# Patient Record
Sex: FEMALE | Race: WHITE | NOT HISPANIC OR LATINO | Employment: OTHER | URBAN - METROPOLITAN AREA
[De-identification: names, ages, dates, MRNs, and addresses within clinical notes are randomized per-mention and may not be internally consistent; named-entity substitution may affect disease eponyms.]

---

## 2019-05-02 ENCOUNTER — OFFICE VISIT (OUTPATIENT)
Dept: FAMILY MEDICINE CLINIC | Facility: CLINIC | Age: 69
End: 2019-05-02

## 2019-05-02 VITALS
DIASTOLIC BLOOD PRESSURE: 68 MMHG | OXYGEN SATURATION: 97 % | SYSTOLIC BLOOD PRESSURE: 100 MMHG | HEART RATE: 77 BPM | TEMPERATURE: 98.1 F | WEIGHT: 177 LBS | BODY MASS INDEX: 26.83 KG/M2 | HEIGHT: 68 IN | RESPIRATION RATE: 18 BRPM

## 2019-05-02 DIAGNOSIS — R10.11 RUQ ABDOMINAL PAIN: ICD-10-CM

## 2019-05-02 DIAGNOSIS — K80.20 CALCULUS OF GALLBLADDER WITHOUT CHOLECYSTITIS WITHOUT OBSTRUCTION: Primary | ICD-10-CM

## 2019-05-02 DIAGNOSIS — Z98.890 HISTORY OF MOHS MICROGRAPHIC SURGERY FOR SQUAMOUS CELL CARCINOMA IN SITU (SCCIS) OF SKIN: ICD-10-CM

## 2019-05-02 DIAGNOSIS — I10 ESSENTIAL HYPERTENSION: ICD-10-CM

## 2019-05-02 DIAGNOSIS — E78.2 MIXED HYPERLIPIDEMIA: ICD-10-CM

## 2019-05-02 DIAGNOSIS — Z86.007 HISTORY OF MOHS MICROGRAPHIC SURGERY FOR SQUAMOUS CELL CARCINOMA IN SITU (SCCIS) OF SKIN: ICD-10-CM

## 2019-05-02 PROCEDURE — 99203 OFFICE O/P NEW LOW 30 MIN: CPT | Performed by: FAMILY MEDICINE

## 2019-05-02 RX ORDER — BUPROPION HYDROCHLORIDE 150 MG/1
150 TABLET ORAL DAILY
COMMUNITY
End: 2021-07-21

## 2019-05-02 RX ORDER — CHLORTHALIDONE 25 MG/1
25 TABLET ORAL DAILY
COMMUNITY

## 2019-05-02 RX ORDER — ATORVASTATIN CALCIUM 80 MG/1
80 TABLET, FILM COATED ORAL DAILY
COMMUNITY

## 2019-05-02 RX ORDER — AMLODIPINE BESYLATE 5 MG/1
5 TABLET ORAL DAILY
COMMUNITY

## 2019-05-02 RX ORDER — ALBUTEROL SULFATE 90 UG/1
AEROSOL, METERED RESPIRATORY (INHALATION)
COMMUNITY
Start: 2012-01-11 | End: 2021-07-21

## 2019-05-02 RX ORDER — LISINOPRIL 10 MG/1
10 TABLET ORAL DAILY
COMMUNITY
End: 2021-07-21 | Stop reason: ALTCHOICE

## 2019-05-02 NOTE — PROGRESS NOTES
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     DISCUSSION  Due to recent visualization of gallstones on CT scan of chest and intermittent symptoms of right upper quadrant pain, check ultrasound.  If the ultrasound confirms multiple gallstones with any gallbladder wall thickening, she may need to consider removal.  Further plans once that is back.    Otherwise chronic problems including hypertension, hyperlipidemia are all stable.    Repeat labs at end of June (CMP and lipids )    MEDICATIONS PRESCRIBED  Requested Prescriptions      No prescriptions requested or ordered in this encounter          -------------------------------------------    Subjective     Chief Complaint   Patient presents with   • Establish Care   • Cholelithiasis         Hypertension   This is a chronic problem. The current episode started more than 1 year ago. The problem is unchanged. The problem is controlled. Pertinent negatives include no chest pain (unless with gallbladder attack), headaches, peripheral edema or shortness of breath. There are no associated agents to hypertension. Risk factors for coronary artery disease include dyslipidemia. Current antihypertension treatment includes calcium  channel blockers, diuretics and ACE inhibitors. The current treatment provides moderate improvement. There are no compliance problems.  There is no history of angina, kidney disease or CAD/MI. There is no history of chronic renal disease.   Hyperlipidemia   This is a chronic problem. The current episode started more than 1 year ago. The problem is controlled (med just increased whern LDL > 100). She has no history of chronic renal disease. Pertinent negatives include no chest pain (unless with gallbladder attack), myalgias (has some aches in the am) or shortness of breath. Current antihyperlipidemic treatment includes statins. The current treatment provides moderate improvement of lipids. There are no compliance problems.  Risk factors for coronary artery disease include dyslipidemia and hypertension.       Here as new patient   Lives in [place] (Oct to April) and here during summer.   [place] ( [place] of [place]        Gallstones  Dx 4 months  Occ pain   Right side, may radiate to the chest  Had CT for chest cancer screening : found lymph node and had repeat CT and no change and to repeat in one year. Last 3/25/2019    Has had 2 attacks  Last attack: 10 days    Trying to watch greasy fried foods    Does not want gallstones/ gallbladder out at this time    U/s was not done    History of fracture foot     HS in [place]  Went to [employer]   Grad in , Achieved.coications   3 children  8 grandchildren   for 20 yrs              Social History     Tobacco Use   Smoking Status Former Smoker   • Years: 40.00   • Last attempt to quit: 2019   • Years since quittin.0        Past Medical History,Medications, Allergies, and social history was reviewed.      Review of Systems   Constitutional: Negative.    HENT: Negative.    Respiratory: Negative.  Negative for shortness of breath.    Cardiovascular: Negative.  Negative for chest pain (unless with gallbladder attack).   Gastrointestinal: Positive for  "abdominal pain. Negative for blood in stool, nausea and vomiting.   Musculoskeletal: Positive for arthralgias. Negative for myalgias (has some aches in the am).   Neurological: Negative.    Psychiatric/Behavioral: Negative.        Objective     Vitals:    05/02/19 1404   BP: 100/68   BP Location: Left arm   Patient Position: Sitting   Cuff Size: Adult   Pulse: 77   Resp: 18   Temp: 98.1 °F (36.7 °C)   TempSrc: Temporal   SpO2: 97%   Weight: 80.3 kg (177 lb)   Height: 173 cm (68.11\")          Physical Exam   Constitutional: She appears well-developed and well-nourished.   HENT:   Head: Normocephalic and atraumatic.   Right Ear: Hearing, tympanic membrane, external ear and ear canal normal.   Left Ear: Hearing, tympanic membrane, external ear and ear canal normal.   Mouth/Throat: Oropharynx is clear and moist.   Eyes: Conjunctivae and EOM are normal. Pupils are equal, round, and reactive to light.   Neck: Normal range of motion. Neck supple. No thyromegaly present.   Cardiovascular: Normal rate, regular rhythm and normal heart sounds. Exam reveals no gallop and no friction rub.   No murmur heard.  Pulmonary/Chest: Effort normal and breath sounds normal. No respiratory distress. She has no wheezes. She has no rales.   Abdominal: Soft. Bowel sounds are normal. She exhibits no distension. There is no tenderness. There is no rebound and no guarding.   Musculoskeletal: She exhibits no edema.   Neurological: She is alert.   Skin: Skin is warm and dry.   Psychiatric: She has a normal mood and affect.   Nursing note and vitals reviewed.              Faisal Mar MD    "

## 2019-05-07 ENCOUNTER — HOSPITAL ENCOUNTER (OUTPATIENT)
Dept: ULTRASOUND IMAGING | Facility: HOSPITAL | Age: 69
Discharge: HOME | End: 2019-05-07
Admitting: FAMILY MEDICINE

## 2019-05-07 DIAGNOSIS — K80.20 CALCULUS OF GALLBLADDER WITHOUT CHOLECYSTITIS WITHOUT OBSTRUCTION: ICD-10-CM

## 2019-05-07 DIAGNOSIS — R10.11 RUQ ABDOMINAL PAIN: ICD-10-CM

## 2019-05-07 PROCEDURE — 76705 ECHO EXAM OF ABDOMEN: CPT

## 2019-06-17 ENCOUNTER — RESULTS ENCOUNTER (OUTPATIENT)
Dept: FAMILY MEDICINE CLINIC | Facility: CLINIC | Age: 69
End: 2019-06-17

## 2019-06-17 DIAGNOSIS — E78.2 MIXED HYPERLIPIDEMIA: ICD-10-CM

## 2019-06-17 DIAGNOSIS — I10 ESSENTIAL HYPERTENSION: ICD-10-CM

## 2019-07-10 ENCOUNTER — APPOINTMENT (OUTPATIENT)
Dept: LAB | Facility: HOSPITAL | Age: 69
End: 2019-07-10

## 2019-07-10 PROCEDURE — 80053 COMPREHEN METABOLIC PANEL: CPT | Performed by: FAMILY MEDICINE

## 2019-07-10 PROCEDURE — 80061 LIPID PANEL: CPT | Performed by: FAMILY MEDICINE

## 2019-07-11 LAB
ALBUMIN SERPL-MCNC: 4.7 G/DL (ref 3.5–5.2)
ALBUMIN/GLOB SERPL: 2.2 G/DL
ALP SERPL-CCNC: 93 U/L (ref 39–117)
ALT SERPL W P-5'-P-CCNC: 20 U/L (ref 1–33)
ANION GAP SERPL CALCULATED.3IONS-SCNC: 14.2 MMOL/L (ref 5–15)
AST SERPL-CCNC: 24 U/L (ref 1–32)
BILIRUB SERPL-MCNC: 0.5 MG/DL (ref 0.2–1.2)
BUN BLD-MCNC: 6 MG/DL (ref 8–23)
BUN/CREAT SERPL: 9 (ref 7–25)
CALCIUM SPEC-SCNC: 10.1 MG/DL (ref 8.6–10.5)
CHLORIDE SERPL-SCNC: 96 MMOL/L (ref 98–107)
CHOLEST SERPL-MCNC: 169 MG/DL (ref 0–200)
CO2 SERPL-SCNC: 26.8 MMOL/L (ref 22–29)
CREAT BLD-MCNC: 0.67 MG/DL (ref 0.57–1)
GFR SERPL CREATININE-BSD FRML MDRD: 88 ML/MIN/1.73
GLOBULIN UR ELPH-MCNC: 2.1 GM/DL
GLUCOSE BLD-MCNC: 101 MG/DL (ref 65–99)
HDLC SERPL-MCNC: 68 MG/DL (ref 40–60)
LDLC SERPL CALC-MCNC: 77 MG/DL (ref 0–100)
LDLC/HDLC SERPL: 1.13 {RATIO}
POTASSIUM BLD-SCNC: 5.2 MMOL/L (ref 3.5–5.2)
PROT SERPL-MCNC: 6.8 G/DL (ref 6–8.5)
SODIUM BLD-SCNC: 137 MMOL/L (ref 136–145)
TRIGL SERPL-MCNC: 122 MG/DL (ref 0–150)
VLDLC SERPL-MCNC: 24.4 MG/DL (ref 5–40)

## 2019-08-01 ENCOUNTER — TELEPHONE (OUTPATIENT)
Dept: FAMILY MEDICINE CLINIC | Facility: CLINIC | Age: 69
End: 2019-08-01

## 2019-08-01 DIAGNOSIS — Z96.649 HISTORY OF HIP REPLACEMENT, UNSPECIFIED LATERALITY: Primary | ICD-10-CM

## 2019-08-01 NOTE — TELEPHONE ENCOUNTER
Please call, does she have heart murmur or heart valve replacement? Or any joint replacements? Just need to know why the dentist wants her on antibiotic.

## 2019-08-01 NOTE — TELEPHONE ENCOUNTER
----- Message from Manjinder Brown Rep sent at 8/1/2019  1:32 PM EDT -----  Contact: [facility] / PT   PT CALLED AND STATED THAT SHE IS HAVING DENTAL WORK DONE ON 8/7 AND HER DENTIST STATED THAT SHE NEEDS TO BE ON AN ANTIBIOTIC - SHE IS HAVING A CLEANING AND A CHECK UP - NO EXTRACTIONS    [facility]    PT CALL BACK [phone]

## 2019-08-02 RX ORDER — AMOXICILLIN 500 MG/1
2000 CAPSULE ORAL ONCE
Qty: 4 CAPSULE | Refills: 0 | Status: SHIPPED | OUTPATIENT
Start: 2019-08-02 | End: 2019-08-02

## 2019-08-02 NOTE — TELEPHONE ENCOUNTER
Please call, requested meds sent to pharmacy.     Amoxicillin 500 mg 4 pills one hour before procedure.

## 2021-07-21 ENCOUNTER — OFFICE VISIT (OUTPATIENT)
Dept: FAMILY MEDICINE CLINIC | Facility: CLINIC | Age: 71
End: 2021-07-21

## 2021-07-21 VITALS
HEIGHT: 70 IN | TEMPERATURE: 97.3 F | HEART RATE: 64 BPM | DIASTOLIC BLOOD PRESSURE: 70 MMHG | RESPIRATION RATE: 24 BRPM | SYSTOLIC BLOOD PRESSURE: 130 MMHG | WEIGHT: 180.6 LBS | BODY MASS INDEX: 25.86 KG/M2 | OXYGEN SATURATION: 97 %

## 2021-07-21 DIAGNOSIS — J44.0 COPD WITH ACUTE BRONCHITIS (HCC): Primary | ICD-10-CM

## 2021-07-21 DIAGNOSIS — J20.9 COPD WITH ACUTE BRONCHITIS (HCC): Primary | ICD-10-CM

## 2021-07-21 PROCEDURE — 99213 OFFICE O/P EST LOW 20 MIN: CPT | Performed by: FAMILY MEDICINE

## 2021-07-21 RX ORDER — LOSARTAN POTASSIUM 25 MG/1
25 TABLET ORAL DAILY
COMMUNITY

## 2021-07-21 RX ORDER — PREDNISONE 20 MG/1
20 TABLET ORAL 2 TIMES DAILY
Qty: 10 TABLET | Refills: 0 | Status: SHIPPED | OUTPATIENT
Start: 2021-07-21 | End: 2021-07-26

## 2021-07-21 RX ORDER — AMOXICILLIN AND CLAVULANATE POTASSIUM 875; 125 MG/1; MG/1
1 TABLET, FILM COATED ORAL 2 TIMES DAILY
Qty: 20 TABLET | Refills: 0 | Status: SHIPPED | OUTPATIENT
Start: 2021-07-21 | End: 2021-07-31

## 2021-07-21 RX ORDER — ALBUTEROL SULFATE 90 UG/1
2 AEROSOL, METERED RESPIRATORY (INHALATION) EVERY 4 HOURS PRN
Qty: 8 G | Refills: 0 | Status: SHIPPED | OUTPATIENT
Start: 2021-07-21

## 2021-07-21 NOTE — PROGRESS NOTES
"Chief Complaint  cough, chest congestion, HA (started last Wed. / just finished steroid, taking Tessalon Perles & Mucinex  )    Subjective          Lanie Ruiz presents to [facility]  Cough  This is a new problem. The current episode started in the past 7 days. The problem has been unchanged. The problem occurs every few minutes. The cough is productive of sputum. Associated symptoms include headaches, nasal congestion, shortness of breath and wheezing. Pertinent negatives include no ear congestion, ear pain, postnasal drip or sore throat. The symptoms are aggravated by lying down. Risk factors for lung disease include smoking/tobacco exposure. She has tried oral steroids (Mucinex, steroid, Tessalon) for the symptoms. The treatment provided mild relief.     The following portions of the patient's history were reviewed and updated as appropriate: allergies, current medications, past family history, past medical history, past social history, past surgical history and problem list.    Objective   Vital Signs:   /70   Pulse 64   Temp 97.3 °F (36.3 °C)   Resp 24   Ht 177.8 cm (70\")   Wt 81.9 kg (180 lb 9.6 oz)   SpO2 97%   BMI 25.91 kg/m²     Physical Exam  Vitals and nursing note reviewed.   Constitutional:       Appearance: She is well-developed.   HENT:      Head: Normocephalic and atraumatic.      Right Ear: Tympanic membrane, ear canal and external ear normal.      Left Ear: Tympanic membrane, ear canal and external ear normal.      Nose: Nose normal.   Eyes:      Conjunctiva/sclera: Conjunctivae normal.   Cardiovascular:      Rate and Rhythm: Normal rate and regular rhythm.      Heart sounds: Normal heart sounds.   Pulmonary:      Effort: Pulmonary effort is normal.      Breath sounds: Examination of the left-upper field reveals wheezing. Wheezing present. No rhonchi.   Musculoskeletal:         General: No swelling.      Cervical back: Neck supple.   Skin:     " General: Skin is warm and dry.   Neurological:      Mental Status: She is alert and oriented to person, place, and time.   Psychiatric:         Behavior: Behavior normal.        Result Review :                 Assessment and Plan    Diagnoses and all orders for this visit:    1. COPD with acute bronchitis (CMS/HCC) (Primary)  -     predniSONE (DELTASONE) 20 MG tablet; Take 1 tablet by mouth 2 (Two) Times a Day for 5 days.  Dispense: 10 tablet; Refill: 0  -     albuterol sulfate  (90 Base) MCG/ACT inhaler; Inhale 2 puffs Every 4 (Four) Hours As Needed for Wheezing or Shortness of Air.  Dispense: 8 g; Refill: 0  -     amoxicillin-clavulanate (Augmentin) 875-125 MG per tablet; Take 1 tablet by mouth 2 (Two) Times a Day for 10 days.  Dispense: 20 tablet; Refill: 0    Follow up if symptoms don't resolve.       Follow Up   Return if symptoms worsen or fail to improve.  Patient was given instructions and counseling regarding her condition or for health maintenance advice. Please see specific information pulled into the AVS if appropriate.

## 2021-08-03 ENCOUNTER — TELEPHONE (OUTPATIENT)
Dept: FAMILY MEDICINE CLINIC | Facility: CLINIC | Age: 71
End: 2021-08-03

## 2021-08-03 RX ORDER — FLUCONAZOLE 150 MG/1
TABLET ORAL
Qty: 2 TABLET | Refills: 0 | Status: SHIPPED | OUTPATIENT
Start: 2021-08-03

## 2021-08-03 NOTE — TELEPHONE ENCOUNTER
Caller: Joseph Katerine    Relationship: Self    Best call back number: [phone]    What medication are you requesting: something for yeast infection    What are your current symptoms: itching    How long have you been experiencing symptoms: 3 days    Have you had these symptoms before:    [x] Yes  [] No    Have you been treated for these symptoms before:   [x] Yes  [] No    If a prescription is needed, what is your preferred pharmacy and phone number:      Additional notes: [facility] [phone]

## 2023-03-01 NOTE — TELEPHONE ENCOUNTER
Please call.  I sent in the Diflucan pill 1 now and 1 in 3 days if not getting better.   Tarsha Lacey MD

## 2023-04-19 ENCOUNTER — OFFICE VISIT (OUTPATIENT)
Dept: FAMILY MEDICINE CLINIC | Facility: CLINIC | Age: 73
End: 2023-04-19
Payer: MEDICARE

## 2023-04-19 VITALS
HEART RATE: 76 BPM | DIASTOLIC BLOOD PRESSURE: 84 MMHG | OXYGEN SATURATION: 97 % | SYSTOLIC BLOOD PRESSURE: 142 MMHG | TEMPERATURE: 97.5 F | WEIGHT: 174.6 LBS | BODY MASS INDEX: 25 KG/M2 | HEIGHT: 70 IN | RESPIRATION RATE: 18 BRPM

## 2023-04-19 DIAGNOSIS — M79.605 BILATERAL LEG PAIN: Primary | ICD-10-CM

## 2023-04-19 DIAGNOSIS — M79.604 BILATERAL LEG PAIN: Primary | ICD-10-CM

## 2023-04-19 DIAGNOSIS — F17.200 CURRENT EVERY DAY SMOKER: ICD-10-CM

## 2023-04-19 PROCEDURE — 99214 OFFICE O/P EST MOD 30 MIN: CPT | Performed by: PHYSICIAN ASSISTANT

## 2023-04-19 PROCEDURE — 3079F DIAST BP 80-89 MM HG: CPT | Performed by: PHYSICIAN ASSISTANT

## 2023-04-19 PROCEDURE — 3077F SYST BP >= 140 MM HG: CPT | Performed by: PHYSICIAN ASSISTANT

## 2023-04-19 RX ORDER — IBUPROFEN 800 MG
TABLET ORAL
COMMUNITY
End: 2023-04-19

## 2023-04-19 RX ORDER — PREDNISONE 10 MG/1
TABLET ORAL
Qty: 21 EACH | Refills: 0 | Status: SHIPPED | OUTPATIENT
Start: 2023-04-19

## 2023-04-20 DIAGNOSIS — E78.00 HYPERCHOLESTEREMIA: Primary | ICD-10-CM

## 2023-04-20 LAB
BUN SERPL-MCNC: 11 MG/DL (ref 8–23)
BUN/CREAT SERPL: 15.3 (ref 7–25)
CALCIUM SERPL-MCNC: 10.3 MG/DL (ref 8.6–10.5)
CHLORIDE SERPL-SCNC: 102 MMOL/L (ref 98–107)
CK SERPL-CCNC: 159 U/L (ref 20–180)
CO2 SERPL-SCNC: 25.6 MMOL/L (ref 22–29)
CREAT SERPL-MCNC: 0.72 MG/DL (ref 0.57–1)
CRP SERPL-MCNC: <0.3 MG/DL (ref 0–0.5)
D DIMER PPP FEU-MCNC: 0.32 MCGFEU/ML (ref 0–0.71)
EGFRCR SERPLBLD CKD-EPI 2021: 89.5 ML/MIN/1.73
ERYTHROCYTE [SEDIMENTATION RATE] IN BLOOD BY WESTERGREN METHOD: 3 MM/HR (ref 0–30)
GLUCOSE SERPL-MCNC: 86 MG/DL (ref 65–99)
MAGNESIUM SERPL-MCNC: 2.2 MG/DL (ref 1.6–2.4)
POTASSIUM SERPL-SCNC: 4.7 MMOL/L (ref 3.5–5.2)
SODIUM SERPL-SCNC: 141 MMOL/L (ref 136–145)

## 2023-04-20 RX ORDER — ATORVASTATIN CALCIUM 40 MG/1
40 TABLET, FILM COATED ORAL DAILY
Qty: 90 TABLET | Refills: 1 | Status: SHIPPED | OUTPATIENT
Start: 2023-04-20

## 2023-05-09 ENCOUNTER — OFFICE VISIT (OUTPATIENT)
Dept: FAMILY MEDICINE CLINIC | Facility: CLINIC | Age: 73
End: 2023-05-09
Payer: MEDICARE

## 2023-05-09 VITALS
OXYGEN SATURATION: 96 % | TEMPERATURE: 97.5 F | RESPIRATION RATE: 18 BRPM | BODY MASS INDEX: 24.91 KG/M2 | DIASTOLIC BLOOD PRESSURE: 80 MMHG | HEIGHT: 70 IN | SYSTOLIC BLOOD PRESSURE: 118 MMHG | WEIGHT: 174 LBS | HEART RATE: 77 BPM

## 2023-05-09 DIAGNOSIS — L98.9 SKIN LESION OF RIGHT LEG: ICD-10-CM

## 2023-05-09 DIAGNOSIS — J30.2 SEASONAL ALLERGIC RHINITIS, UNSPECIFIED TRIGGER: Primary | ICD-10-CM

## 2023-05-09 DIAGNOSIS — J30.2 SEASONAL ALLERGIC RHINITIS, UNSPECIFIED TRIGGER: ICD-10-CM

## 2023-05-09 DIAGNOSIS — Z85.89 HISTORY OF SQUAMOUS CELL CARCINOMA: ICD-10-CM

## 2023-05-09 PROCEDURE — 99214 OFFICE O/P EST MOD 30 MIN: CPT

## 2023-05-09 PROCEDURE — 3074F SYST BP LT 130 MM HG: CPT

## 2023-05-09 PROCEDURE — 1160F RVW MEDS BY RX/DR IN RCRD: CPT

## 2023-05-09 PROCEDURE — 3079F DIAST BP 80-89 MM HG: CPT

## 2023-05-09 PROCEDURE — 1159F MED LIST DOCD IN RCRD: CPT

## 2023-05-09 RX ORDER — LORATADINE 10 MG/1
10 TABLET ORAL DAILY
Qty: 30 TABLET | Refills: 1 | Status: SHIPPED | OUTPATIENT
Start: 2023-05-09

## 2023-05-09 RX ORDER — FLUTICASONE PROPIONATE 50 MCG
2 SPRAY, SUSPENSION (ML) NASAL DAILY
Qty: 15.8 ML | Refills: 0 | Status: SHIPPED | OUTPATIENT
Start: 2023-05-09

## 2023-05-09 RX ORDER — PHENTERMINE HYDROCHLORIDE 37.5 MG/1
37.5 CAPSULE ORAL EVERY MORNING
COMMUNITY

## 2023-05-09 RX ORDER — FLUTICASONE PROPIONATE 50 MCG
SPRAY, SUSPENSION (ML) NASAL
Qty: 48 G | OUTPATIENT
Start: 2023-05-09

## 2023-05-09 NOTE — PROGRESS NOTES
"Chief Complaint   Patient presents with   • Sinus Problem     Sneezing and  runny nose x 3 weeks. Otc not helping  Would like a dermatology referral        Subjective      Lanie Ruiz is a 72 y.o. who presents for rhinorrhea and sneezing for the last three weeks.  Took benadryl at home and she did not see any improvement.  States she was inside all day 3-4 days ago and had no sneezing, but then she woke up in the night and had sneezing.  Took benadryl 2 days ago and did not see any difference.  Took mucinex and it helped some, but she has never had allergies before and would like to make sure that is what is going on.  She has not tried any daily allergy medications or nasal sprays.  She denies fever/chills.     Dermatology - would like referral for skin check.  Cannot be seen until July with her regular dermatologist.  She has a history of skin cancer and would like an evaluation sooner.  She has two places that have come up on her posterior leg on the right side that look similar to places she has had removed in the past.       Review of Systems   Constitutional: Negative for chills and fever.   HENT: Positive for congestion, postnasal drip, rhinorrhea and sinus pressure. Negative for ear pain, sinus pain and sore throat.    Eyes: Negative.    Respiratory: Negative for cough, chest tightness and shortness of breath.    Cardiovascular: Negative for chest pain and palpitations.   Gastrointestinal: Negative.    Genitourinary: Negative.    Neurological: Negative for dizziness.        Objective   Vital Signs:  /80   Pulse 77   Temp 97.5 °F (36.4 °C)   Resp 18   Ht 177.8 cm (70\")   Wt 78.9 kg (174 lb)   SpO2 96%   BMI 24.97 kg/m²     Physical Exam  Vitals and nursing note reviewed.   Constitutional:       Appearance: Normal appearance.   HENT:      Head: Normocephalic.      Right Ear: Tympanic membrane, ear canal and external ear normal.      Left Ear: Tympanic membrane, ear canal and external ear normal. "      Nose: Rhinorrhea present. No nasal tenderness. Rhinorrhea is clear.      Right Turbinates: Swollen.      Left Turbinates: Swollen.      Right Sinus: No maxillary sinus tenderness or frontal sinus tenderness.      Left Sinus: No maxillary sinus tenderness or frontal sinus tenderness.      Mouth/Throat:      Mouth: Mucous membranes are moist.      Pharynx: Oropharynx is clear.   Eyes:      General:         Right eye: No discharge.         Left eye: No discharge.      Conjunctiva/sclera: Conjunctivae normal.   Cardiovascular:      Heart sounds: Normal heart sounds.   Pulmonary:      Breath sounds: Normal breath sounds.   Skin:         Neurological:      Mental Status: She is alert.          Result Review                     Assessment and Plan  Diagnoses and all orders for this visit:    1. Seasonal allergic rhinitis, unspecified trigger (Primary)  -     loratadine (Claritin) 10 MG tablet; Take 1 tablet by mouth Daily.  Dispense: 30 tablet; Refill: 1  -     fluticasone (FLONASE) 50 MCG/ACT nasal spray; 2 sprays into the nostril(s) as directed by provider Daily.  Dispense: 15.8 mL; Refill: 0    2. Skin lesion of right leg  -     Ambulatory Referral to Dermatology    3. History of squamous cell carcinoma  -     Ambulatory Referral to Dermatology      1. Allergic rhinitis - will start flonase and claritin to help with symptoms.  We discussed not using claritin-D for now and the risks of decongestants. Follow up if no improvement  2. Skin lesion - offered referral, but advised patient that she may be able to get in sooner if she gets on a cancellation list and is more likely to get a sooner appointment at the office she is established with.  Patient would still like a referral to dermatology to see if they can see her any sooner.       Patient Instructions   1. Flonase and claritin as prescribed.    2. May take mucinex OTC per package instructions as well  3. Referral to dermatology as well  4. Follow up if no  improvement or worsening in the next few weeks    Discussed medications prescribed and OTC medications recommended.  Discussed medication safety, possible side effects and how to take or administer medications. Instructed patient to report any adverse reactions, side effects or concerns.     Reviewed physical exam findings and plan with patient who verbalized understanding and agrees with plan of care. Patient was given opportunity to ask questions and all concerns were addressed prior to the conclusion of today's visit.         Follow Up  No follow-ups on file.  Patient was given instructions and counseling regarding her condition or for health maintenance advice. Please see specific information pulled into the AVS if appropriate.

## 2023-05-09 NOTE — PATIENT INSTRUCTIONS
Flonase and claritin as prescribed.    May take mucinex OTC per package instructions as well  Referral to dermatology as well  Follow up if no improvement or worsening in the next few weeks

## 2023-06-02 ENCOUNTER — OFFICE VISIT (OUTPATIENT)
Dept: FAMILY MEDICINE CLINIC | Facility: CLINIC | Age: 73
End: 2023-06-02

## 2023-06-02 VITALS
WEIGHT: 173 LBS | HEIGHT: 70 IN | DIASTOLIC BLOOD PRESSURE: 86 MMHG | HEART RATE: 92 BPM | OXYGEN SATURATION: 97 % | TEMPERATURE: 97.7 F | BODY MASS INDEX: 24.77 KG/M2 | SYSTOLIC BLOOD PRESSURE: 138 MMHG

## 2023-06-02 DIAGNOSIS — G89.29 CHRONIC RIGHT-SIDED LOW BACK PAIN WITH RIGHT-SIDED SCIATICA: ICD-10-CM

## 2023-06-02 DIAGNOSIS — J30.2 SEASONAL ALLERGIC RHINITIS, UNSPECIFIED TRIGGER: ICD-10-CM

## 2023-06-02 DIAGNOSIS — M54.6 ACUTE RIGHT-SIDED THORACIC BACK PAIN: Primary | ICD-10-CM

## 2023-06-02 DIAGNOSIS — M54.41 CHRONIC RIGHT-SIDED LOW BACK PAIN WITH RIGHT-SIDED SCIATICA: ICD-10-CM

## 2023-06-02 PROCEDURE — 1159F MED LIST DOCD IN RCRD: CPT

## 2023-06-02 PROCEDURE — 3079F DIAST BP 80-89 MM HG: CPT

## 2023-06-02 PROCEDURE — 99214 OFFICE O/P EST MOD 30 MIN: CPT

## 2023-06-02 PROCEDURE — 3075F SYST BP GE 130 - 139MM HG: CPT

## 2023-06-02 PROCEDURE — 1160F RVW MEDS BY RX/DR IN RCRD: CPT

## 2023-06-02 RX ORDER — CYCLOBENZAPRINE HCL 5 MG
5 TABLET ORAL NIGHTLY PRN
Qty: 20 TABLET | Refills: 0 | Status: SHIPPED | OUTPATIENT
Start: 2023-06-02

## 2023-06-02 RX ORDER — LEVOCETIRIZINE DIHYDROCHLORIDE 5 MG/1
5 TABLET, FILM COATED ORAL EVERY EVENING
Qty: 30 TABLET | Refills: 0 | Status: SHIPPED | OUTPATIENT
Start: 2023-06-02

## 2023-06-02 RX ORDER — METHYLPREDNISOLONE 4 MG/1
TABLET ORAL
Qty: 21 TABLET | Refills: 0 | Status: SHIPPED | OUTPATIENT
Start: 2023-06-02

## 2023-06-02 NOTE — PATIENT INSTRUCTIONS
Stop loratadine, start Xyzal as prescribed.  Steroids and muscle relaxer for back  Follow up in 2 weeks for recheck on allergies

## 2023-06-02 NOTE — PROGRESS NOTES
"Chief Complaint   Patient presents with   • Nasal Congestion     Going on for 1 month   • Pain     Sciatic pain is back   • Cough     Worse in the morning. Sneezing all day       Subjective      Lanie Ruiz is a 72 y.o. who presents for congestion, runny nose and continued sneezing over the last 4 weeks.  Has been using Flonase and taking Claritin, but no difference in symptoms.    Back pain - no injury.  Right shoulder blade area is affected.  Describes as a deep aching pain.  She is also having sciatic pain from right hip down to right knee.  Waking her up at night. [place] and her chronic sciatic pain is flared up this morning.     The following portions of the patient's history were reviewed and updated as appropriate: allergies, current medications, past family history, past medical history, past social history, past surgical history and problem list.    Review of Systems   Constitutional: Negative for chills and fever.   HENT: Positive for congestion, postnasal drip, rhinorrhea, sinus pressure and sneezing. Negative for ear discharge, ear pain and sore throat.    Respiratory: Positive for cough (clear mucous production). Negative for shortness of breath and wheezing.    Cardiovascular: Negative for chest pain and palpitations.   Gastrointestinal: Negative.    Musculoskeletal: Positive for back pain (right upper back below shoulder blade x 1 week).       Objective   Vital Signs:  /86   Pulse 92   Temp 97.7 °F (36.5 °C) (Infrared)   Ht 177.8 cm (70\")   Wt 78.5 kg (173 lb)   SpO2 97%   BMI 24.82 kg/m²     BMI is within normal parameters. No other follow-up for BMI required.        Physical Exam  Vitals and nursing note reviewed.   Constitutional:       Appearance: Normal appearance.   HENT:      Right Ear: Tympanic membrane, ear canal and external ear normal.      Left Ear: Tympanic membrane, ear canal and external ear normal.      Nose: Rhinorrhea " present. Rhinorrhea is clear.      Right Turbinates: Not enlarged.      Left Turbinates: Not enlarged.      Right Sinus: No maxillary sinus tenderness or frontal sinus tenderness.      Left Sinus: No maxillary sinus tenderness or frontal sinus tenderness.      Mouth/Throat:      Pharynx: Posterior oropharyngeal erythema present. No oropharyngeal exudate.   Cardiovascular:      Rate and Rhythm: Normal rate and regular rhythm.      Heart sounds: Normal heart sounds.   Pulmonary:      Effort: Pulmonary effort is normal.      Breath sounds: Normal breath sounds.   Musculoskeletal:      Cervical back: Normal. No tenderness or bony tenderness. Normal range of motion.      Thoracic back: Spasms and tenderness present.      Lumbar back: Normal.   Neurological:      General: No focal deficit present.      Mental Status: She is alert and oriented to person, place, and time.   Psychiatric:         Mood and Affect: Mood normal.         Behavior: Behavior normal.          Result Review                     Assessment and Plan  Diagnoses and all orders for this visit:    1. Acute right-sided thoracic back pain (Primary)  -     methylPREDNISolone (MEDROL) 4 MG dose pack; Take as directed on package instructions.  Dispense: 21 tablet; Refill: 0  -     cyclobenzaprine (FLEXERIL) 5 MG tablet; Take 1 tablet by mouth At Night As Needed for Muscle Spasms.  Dispense: 20 tablet; Refill: 0    2. Seasonal allergic rhinitis, unspecified trigger  -     levocetirizine (XYZAL) 5 MG tablet; Take 1 tablet by mouth Every Evening.  Dispense: 30 tablet; Refill: 0    3. Chronic right-sided low back pain with right-sided sciatica      Back pain- Medrol Dosepak and muscle relaxers as prescribed.    Seasonal allergies-stop Claritin as this made no difference in symptoms.  Continue Flonase.  Will try Xyzal.  Consider Singulair at follow-up.  Consider referral to allergist.        Patient Instructions   1. Stop loratadine, start Xyzal as  prescribed.  2. Steroids and muscle relaxer for back  3. Follow up in 2 weeks for recheck on allergies    Discussed medications prescribed and OTC medications recommended.  Discussed medication safety, possible side effects and how to take or administer medications. Instructed patient to report any adverse reactions, side effects or concerns.     Reviewed physical exam findings and plan with patient who verbalized understanding and agrees with plan of care. Patient was given opportunity to ask questions and all concerns were addressed prior to the conclusion of today's visit.     Follow Up  Return in about 2 weeks (around 6/16/2023) for Recheck allergies/back pain.  Patient was given instructions and counseling regarding her condition or for health maintenance advice. Please see specific information pulled into the AVS if appropriate.

## 2023-09-25 ENCOUNTER — OFFICE VISIT (OUTPATIENT)
Dept: CARDIOLOGY | Facility: CLINIC | Age: 73
End: 2023-09-25

## 2023-09-25 VITALS
DIASTOLIC BLOOD PRESSURE: 70 MMHG | SYSTOLIC BLOOD PRESSURE: 128 MMHG | BODY MASS INDEX: 24.77 KG/M2 | OXYGEN SATURATION: 100 % | WEIGHT: 173 LBS | HEIGHT: 70 IN | HEART RATE: 72 BPM

## 2023-09-25 DIAGNOSIS — R42 DIZZINESS: ICD-10-CM

## 2023-09-25 DIAGNOSIS — E78.2 MIXED HYPERLIPIDEMIA: ICD-10-CM

## 2023-09-25 DIAGNOSIS — I10 ESSENTIAL HYPERTENSION: Primary | ICD-10-CM

## 2023-09-25 DIAGNOSIS — R07.89 CHEST PAIN, ATYPICAL: ICD-10-CM

## 2023-09-25 PROCEDURE — 3074F SYST BP LT 130 MM HG: CPT | Performed by: PHYSICIAN ASSISTANT

## 2023-09-25 PROCEDURE — 3078F DIAST BP <80 MM HG: CPT | Performed by: PHYSICIAN ASSISTANT

## 2023-09-25 PROCEDURE — 99203 OFFICE O/P NEW LOW 30 MIN: CPT | Performed by: PHYSICIAN ASSISTANT

## 2023-09-25 PROCEDURE — 93000 ELECTROCARDIOGRAM COMPLETE: CPT | Performed by: PHYSICIAN ASSISTANT

## 2023-09-25 RX ORDER — NICOTINE 21 MG/24HR
1 PATCH, TRANSDERMAL 24 HOURS TRANSDERMAL EVERY 24 HOURS
COMMUNITY
Start: 2023-07-18

## 2023-09-25 RX ORDER — MULTIVIT-MIN/IRON/FOLIC ACID/K 18-600-40
CAPSULE ORAL
COMMUNITY

## 2023-09-25 NOTE — PROGRESS NOTES
"[facility]  INITIAL OFFICE CONSULT      Lanie Ruiz  1950  PCP: Faisal Mar MD    SUBJECTIVE:   Lanie Ruiz is a 72 y.o. female seen for a consultation visit regarding the following:     Chief Complaint:   Chief Complaint   Patient presents with    Hypertension     CONSULT          Consultation is requested by Self Referring for evaluation of Hypertension (CONSULT)        History:  72 year old female orginally form [place], [place]. She currently lives [facility].  She follows with a cardiologist locally in [place] she has never seen a cardiologist in our area.  She also sees Dr. Mar her PCP on occasion but also has a PCP down in [place].  She made an appointment today as a self-referral regarding some symptoms that she is concerned about.  She states for the past month while sitting reading and resting still she will start have a sudden onset of severe pain in her neck pressure knifelike sometimes rating up into her head.  This can be associate with some dizziness and weak feeling.  The symptoms are typically quite short.  They are so short she does not feel it necessary that she go to the hospital.  She tells me she can walk 3 miles without any symptoms.  She does have some chest pain this is intermittent as well sharp stabbing knifelike but occurs only when she is sitting at rest.  She has not had tachypalpitations.  She has not had heart failure symptoms like orthopnea PND peripheral edema.  She does not check her blood pressure regular basis but she restarted herself on chlorthalidone which she had stopped because she was concerned that some these symptoms may be due to blood pressure.  She tells me she is not a diabetic but has taken metformin the past to \"lose weight\".  She remains on aspirin daily amlodipine and Lipitor therapy.  She has tried to quit smoking several times but continues to smoke over half a pack a day.  She presents today for evaluation " regarding noncardiac chest pain and dizziness.      Cardiac PMH: (Old records have been reviewed and summarized below)  Noncardiac chest pain  Remote negative Stress MPS  Followed by EDUARDO  HTN  Multiple Medications  Recently restarted Chlorithiadone  Dizziness  Mild Obesity  HLD  Tobacco abuse, 1/2 ppd   OA   Family History of CAD,Brother MI       Past Medical History, Past Surgical History, Family history, Social History, and Medications were all reviewed with the patient today and updated as necessary.     Current Outpatient Medications   Medication Sig Dispense Refill    amLODIPine (NORVASC) 5 MG tablet Take 1 tablet by mouth Daily.      Ascorbic Acid (Vitamin C) 500 MG capsule Take  by mouth.      atorvastatin (Lipitor) 40 MG tablet Take 1 tablet by mouth Daily. (Patient taking differently: Take 2 tablets by mouth Daily.) 90 tablet 1    Cholecalciferol (Vitamin D-3) 125 MCG (5000 UT) tablet Vitamin D3      Cyanocobalamin (VITAMIN B 12 PO) Take  by mouth.      cyclobenzaprine (FLEXERIL) 5 MG tablet Take 1 tablet by mouth At Night As Needed for Muscle Spasms. 20 tablet 0    fluticasone (FLONASE) 50 MCG/ACT nasal spray 2 sprays into the nostril(s) as directed by provider Daily. 15.8 mL 0    losartan (COZAAR) 25 MG tablet Take 1 tablet by mouth Daily.      nicotine (NICODERM CQ) 21 MG/24HR patch Place 1 patch on the skin as directed by provider Daily.      metFORMIN (GLUCOPHAGE) 500 MG tablet Take 1 tablet by mouth.      phentermine 37.5 MG capsule Take 1 capsule by mouth Every Morning. Pt takes half a pill daily (Patient not taking: Reported on 9/25/2023)       No current facility-administered medications for this visit.     Allergies   Allergen Reactions    Latex Rash         Past Medical History:   Diagnosis Date    Arthritis     Cancer March 9 2023    Squamous cell    Cataract     COPD (chronic obstructive pulmonary disease) 2015    Enlarged lymph node in neck     near trachea    Gall stones     Hearing loss      "Hyperlipidemia     Hypertension     Sciatic pain      Past Surgical History:   Procedure Laterality Date    BREAST SURGERY  2018    Neg biopsies    CATARACT EXTRACTION, BILATERAL       SECTION  1981    COLONOSCOPY      JOINT REPLACEMENT  2017    L hip    MOHS SURGERY      TOTAL HIP ARTHROPLASTY Right     TUBAL ABDOMINAL LIGATION       Family History   Problem Relation Age of Onset    Heart disease Mother     Hypertension Mother     Stroke Mother     Heart attack Mother     Diabetes Father     Hypertension Father     Hyperlipidemia Father     Stroke Brother     Hypertension Brother     Cancer Brother      Social History     Tobacco Use    Smoking status: Every Day     Packs/day: 0.50     Years: 40.00     Pack years: 20.00     Types: Cigarettes     Last attempt to quit: 2023     Years since quittin.3    Smokeless tobacco: Never   Substance Use Topics    Alcohol use: Yes     Alcohol/week: 1.0 standard drink     Types: 1 Shots of liquor per week       ROS:  Review of Symptoms:  General: no recent weight loss/gain, weakness or fatigue  Skin: no rashes, lumps, or other skin changes  HEENT: no dizziness, lightheadedness, or vision changes  Respiratory: no cough or hemoptysis  Cardiovascular: no palpitations, and tachycardia  Gastrointestinal: no black/tarry stools or diarrhea  Urinary: no change in frequency or urgency  Peripheral Vascular: no claudication or leg cramps  Musculoskeletal: no muscle or joint pain/stiffness  Psychiatric: no depression or excessive stress  Neurological: no sensory or motor loss, no syncope.  Dizziness episodes  Hematologic: no anemia, easy bruising or bleeding  Endocrine: no thyroid problems, nor heat or cold intolerance         PHYSICAL EXAM:   /70 (BP Location: Right arm, Patient Position: Sitting)   Pulse 72   Ht 177.8 cm (70\")   Wt 78.5 kg (173 lb)   SpO2 100%   BMI 24.82 kg/m²      Wt Readings from Last 5 Encounters:   23 78.5 kg (173 lb) "   06/02/23 78.5 kg (173 lb)   05/09/23 78.9 kg (174 lb)   04/19/23 79.2 kg (174 lb 9.6 oz)   07/21/21 81.9 kg (180 lb 9.6 oz)     BP Readings from Last 5 Encounters:   09/25/23 128/70   06/02/23 138/86   05/09/23 118/80   04/19/23 142/84   07/21/21 130/70       General-Well Nourished, Well developed  Eyes - PERRLA  Neck- supple, No mass  CV- regular rate and rhythm, no MRG  Lung- clear bilaterally  Abd- soft, +BS  Musc/skel - Norm strength and range of motion  Skin- warm and dry  Neuro - Alert & Oriented x 3, appropriate mood.    Patient's external notes were reviewed.  Independent interpretation of test performed by another physician in facility were reviewed.  Outside laboratory data was also reviewed.    Medical problems and test results were reviewed with the patient today.     Results for orders placed or performed in visit on 04/19/23   Magnesium    Specimen: Blood   Result Value Ref Range    Magnesium 2.2 1.6 - 2.4 mg/dL   CK    Specimen: Blood   Result Value Ref Range    Creatine Kinase 159 20 - 180 U/L   Sedimentation rate, automated    Specimen: Blood   Result Value Ref Range    Sed Rate 3 0 - 30 mm/hr   C-reactive protein    Specimen: Blood   Result Value Ref Range    C-Reactive Protein <0.30 0.00 - 0.50 mg/dL   D-dimer, Quantitative    Specimen: Blood   Result Value Ref Range    D-Dimer, Quant 0.32 0.00 - 0.71 MCGFEU/mL   Basic metabolic panel    Specimen: Blood   Result Value Ref Range    Glucose 86 65 - 99 mg/dL    BUN 11 8 - 23 mg/dL    Creatinine 0.72 0.57 - 1.00 mg/dL    EGFR Result 89.5 >60.0 mL/min/1.73    BUN/Creatinine Ratio 15.3 7.0 - 25.0    Sodium 141 136 - 145 mmol/L    Potassium 4.7 3.5 - 5.2 mmol/L    Chloride 102 98 - 107 mmol/L    Total CO2 25.6 22.0 - 29.0 mmol/L    Calcium 10.3 8.6 - 10.5 mg/dL         Lab Results   Component Value Date    CHOL 169 07/10/2019    HDL 68 (H) 07/10/2019    LDL 77 07/10/2019    VLDL 24.4 07/10/2019       EKG:  (EKG/Tracing has been independently visualized  by me and summarized below)      ECG 12 Lead    Date/Time: 9/25/2023 10:19 AM  Performed by: Carrington Marc PA  Authorized by: Carrington Marc PA   Comparison: not compared with previous ECG   Rhythm: sinus rhythm  Rate: normal  Conduction: conduction normal  ST Segments: ST segments normal  T Waves: T waves normal  QRS axis: normal  Other: no other findings    Clinical impression: normal ECG        ASSESSMENT   1.Chest pain, this is noncardiac in nature does not occur with exertion.  She does have several risk factors for cardiac disease such as hypertension, dyslipidemia ongoing tobacco use and family history of coronary disease.  EKG today is normal.    2.  Hypertension: Controlled on current medical therapy today.  Patient restarted herself on chlorthalidone therapy and continues with on losartan and amlodipine.    3.  Dizziness: This seems to be related with pain in her left neck.  We will go ahead and pursue a carotid duplex scan.  We discussed other possibilities that be causing this discomfort.  She will schedule follow-up with her PCP.    4.  Ongoing tobacco use: Discussed need for immediate cessation    PLAN  GXT stress test regarding atypical chest pain  Carotid duplex scan regarding dizziness  Continue aspirin and statin good blood pressure control.  Discussed need for immediate tobacco cessation  She will present to the ER if the symptoms change in nature discussed this thoroughly with her.  She understands this.  She return to follow-up her office in a few months or sooner as needed.      Cardiology/Electrophysiology  09/25/23  10:14 EDT  Electronically signed by AVELINA Aguayo, 09/25/23, 10:19 AM EDT.

## 2023-10-12 ENCOUNTER — TELEPHONE (OUTPATIENT)
Dept: FAMILY MEDICINE CLINIC | Facility: CLINIC | Age: 73
End: 2023-10-12

## 2023-10-12 ENCOUNTER — OFFICE VISIT (OUTPATIENT)
Dept: FAMILY MEDICINE CLINIC | Facility: CLINIC | Age: 73
End: 2023-10-12
Payer: MEDICARE

## 2023-10-12 ENCOUNTER — HOSPITAL ENCOUNTER (OUTPATIENT)
Dept: GENERAL RADIOLOGY | Facility: HOSPITAL | Age: 73
Discharge: HOME | End: 2023-10-12
Payer: MEDICARE

## 2023-10-12 VITALS
TEMPERATURE: 97.3 F | DIASTOLIC BLOOD PRESSURE: 80 MMHG | BODY MASS INDEX: 25.05 KG/M2 | HEIGHT: 70 IN | OXYGEN SATURATION: 98 % | SYSTOLIC BLOOD PRESSURE: 126 MMHG | HEART RATE: 71 BPM | WEIGHT: 175 LBS | RESPIRATION RATE: 18 BRPM

## 2023-10-12 DIAGNOSIS — G62.9 NEUROPATHY: ICD-10-CM

## 2023-10-12 DIAGNOSIS — G62.9 NEUROPATHY: Primary | ICD-10-CM

## 2023-10-12 DIAGNOSIS — E78.00 HYPERCHOLESTEREMIA: ICD-10-CM

## 2023-10-12 DIAGNOSIS — R20.2 NUMBNESS AND TINGLING OF BOTH LEGS: ICD-10-CM

## 2023-10-12 DIAGNOSIS — M79.604 RIGHT LEG PAIN: ICD-10-CM

## 2023-10-12 DIAGNOSIS — M79.604 RIGHT LEG PAIN: Primary | ICD-10-CM

## 2023-10-12 DIAGNOSIS — R20.0 NUMBNESS AND TINGLING OF BOTH LEGS: ICD-10-CM

## 2023-10-12 PROCEDURE — 73590 X-RAY EXAM OF LOWER LEG: CPT

## 2023-10-12 PROCEDURE — 99214 OFFICE O/P EST MOD 30 MIN: CPT | Performed by: FAMILY MEDICINE

## 2023-10-12 PROCEDURE — 1160F RVW MEDS BY RX/DR IN RCRD: CPT | Performed by: FAMILY MEDICINE

## 2023-10-12 PROCEDURE — 1159F MED LIST DOCD IN RCRD: CPT | Performed by: FAMILY MEDICINE

## 2023-10-12 PROCEDURE — 3074F SYST BP LT 130 MM HG: CPT | Performed by: FAMILY MEDICINE

## 2023-10-12 PROCEDURE — 3079F DIAST BP 80-89 MM HG: CPT | Performed by: FAMILY MEDICINE

## 2023-10-12 RX ORDER — ASPIRIN 81 MG/1
TABLET ORAL
COMMUNITY
Start: 2023-07-01

## 2023-10-12 RX ORDER — ATORVASTATIN CALCIUM 80 MG/1
80 TABLET, FILM COATED ORAL NIGHTLY
Qty: 90 TABLET | Refills: 1 | Status: SHIPPED | OUTPATIENT
Start: 2023-10-12

## 2023-10-12 NOTE — TELEPHONE ENCOUNTER
Called Dr. Valentín Wilson in [facility].   Requesting information on Vaccinations on T dap and Pneumonia .  [facility] # [phone]

## 2023-10-12 NOTE — PROGRESS NOTES
Chief Complaint  Med Refill (Pain in artery left side of neck, went to cardio- having a nuclear test on the 23th of Oct./Rt leg pain in one spot.  For a few months on and off./Need for T dap and Pneumonia- requested vaccination records with Dr. Wilson in [place]. 10/12/23)    Subjective          Lanie Ruiz presents to [facility]  History of Present Illness  The patient is a 72-year-old female who presents today for right leg pain.    Right leg pain  The patient complains of intermittent right leg pain for the past 3 to 4 months. She ambulates almost 3.5 miles a day and occasionally experiences pain during the walk and has to stop. She has a history of sciatic problems, which occasionally acts up, but that has not been a problem. The pain is worse at night when she is trying to sleep. It is not necessarily activity that causes it. She had a test done because she had pain going up through the artery on the left side of her neck. She has had it on both sides since then, but not the same time. It is not every day or constant, but it is scary. She went to the cardiologist, who did not think she had a blood clot. He thought it might be nerve or muscle and to follow up with her primary care physician.     She is scheduled for a carotid imaging test and also a nuclear stress test. She has run out of atorvastatin.     When she had the sciatic problem, one of the PAs gave her a steroid pack and reduced her atorvastatin from 80 mg to 40 mg a day. When this started, she started taking 2 of the 40 mg back up to 80 mg because it had not happened until after she went down to 40 mg. She has always had high cholesterol. She had a complete blood clot count in 04/2022 when she had her physical. She was given a steroid pack on a couple of different occasions. Her leg has been hurting off and on for 3 to 4 months. It does not feel hot to touch at all. It does not look any different than the rest of her  "leg. She can walk without it increasing more. It does not happen every day, but some days it happens. She has to stop, stretch her leg, and rest it and then continue on because she is with some walking buddies. It is tender, but it will wake her up or hurt when she goes to bed.     She has had numbness and tingling in her leg for years. It started at her toes and now it is a third of the way up her calves to both sides. It feels cold to her; however, they are not numb because she can touch and feel she is touching them. She did have a peripheral artery test about 8 to 10 years ago. She had vein surgery on her right leg in 1999. She had hip surgery in 2016. She has not had an x-ray of her right leg. She can not stand for a long time. She has even bought an elastic things to put on her knee when she walks, but she is not sure if it helps, but it did not hurt. She has not walked this week. She was prediabetic a few years ago. She had phentermine and metformin; however, she just quit taking it because it did not seem to be doing anything.    Sleep apnea  She has a CPAP machine that she uses every night. She uses it 6 to 7 hours.    Health maintenance  She does not know when she last had a tetanus shot. She does not want the flu. She is not sure if she had the pneumonia before or not. She has had 3 COVID-19 vaccinations this year. She is due for a mammogram in 11/2023.    Review of Systems   Respiratory: Negative.     Cardiovascular: Negative.    Gastrointestinal: Negative.    All other systems reviewed and are negative.       Objective       Vital Signs:   /80   Pulse 71   Temp 97.3 øF (36.3 øC)   Resp 18   Ht 177.8 cm (70\")   Wt 79.4 kg (175 lb)   SpO2 98%   BMI 25.11 kg/mý     Physical Exam  Vitals and nursing note reviewed.   Constitutional:       General: She is not in acute distress.     Appearance: She is well-developed. She is not ill-appearing.   HENT:      Head: Normocephalic and atraumatic.      " Right Ear: Hearing, tympanic membrane, ear canal and external ear normal.      Left Ear: Hearing, tympanic membrane, ear canal and external ear normal.      Nose: Nose normal. No congestion or rhinorrhea.      Mouth/Throat:      Mouth: Mucous membranes are moist.      Pharynx: No oropharyngeal exudate or posterior oropharyngeal erythema.   Eyes:      General:         Right eye: No discharge.         Left eye: No discharge.      Conjunctiva/sclera: Conjunctivae normal.      Pupils: Pupils are equal, round, and reactive to light.   Neck:      Thyroid: No thyromegaly.   Cardiovascular:      Rate and Rhythm: Normal rate and regular rhythm.      Heart sounds: Normal heart sounds. No murmur heard.     No friction rub. No gallop.   Pulmonary:      Effort: Pulmonary effort is normal. No respiratory distress.      Breath sounds: Normal breath sounds. No wheezing or rales.   Abdominal:      General: Bowel sounds are normal. There is no distension.      Palpations: Abdomen is soft. There is no mass.      Tenderness: There is no abdominal tenderness. There is no guarding or rebound.   Musculoskeletal:      Right lower leg: No edema.      Left lower leg: No edema.      Comments: No significant tenderness on examination of the right leg in the calf.  No swelling.  No redness or warmth.  Mild decrease sensation of both lower extremities to the mid pretibial area.   Lymphadenopathy:      Cervical: No cervical adenopathy.   Skin:     General: Skin is warm and dry.      Coloration: Skin is not jaundiced or pale.   Neurological:      General: No focal deficit present.      Mental Status: She is alert.   Psychiatric:         Mood and Affect: Mood normal.          Result Review :          No results were obtained or interpreted today.           Assessment and Plan    Diagnoses and all orders for this visit:    1. Right leg pain (Primary)  -     XR tibia fibula 2 vw right; Future  -     EMG & Nerve Conduction Test; Future    2.  Neuropathy  -     EMG & Nerve Conduction Test; Future    3. Hypercholesteremia  -     atorvastatin (Lipitor) 80 MG tablet; Take 1 tablet by mouth Every Night.  Dispense: 90 tablet; Refill: 1    4. Numbness and tingling of both legs  -     EMG & Nerve Conduction Test; Future          DISCUSSION    1. Right lower leg pain  Patient has had a persistent right lower leg pain for quite some time. It seems to be intensifying and in addition, she is having some numbness in both of her legs, which has been going on for a couple of years. She is going to check her labs that she had done in 04/2023 to see if they did A1c and B12. If not, then we will have her stop back in to get those done. In addition, we will get her set up for a nerve conduction study and an x-ray of her right leg given the pain does not appear to be vascular. She is not having any type of claudication symptoms. Further plan once x-ray and labs reviewed.    2. Hyperlipidemia  She had decreased her atorvastatin to 40 mg when she was having some back pain, but it really did not make any difference. She has increased back to 80 mg. She is going to let me know what her cholesterol results were in 04/2023, increase back to 80 mg daily. She has not had any previous issues with the atorvastatin 80 mg.    3. Immunization status  We have called for records for Tdap and pneumonia shot done at previous physician in [place]. She does go there and lives in 4 to 6 months of the year. We will call her back if she needs a tetanus or pneumonia shot. She does not wish to receive flu shot.        Follow Up   Return for follow up depends on review of labs and testing.    Patient was given instructions and counseling regarding her condition or for health maintenance advice. Please see specific information pulled into the AVS if appropriate.       Faisal Mar MD    Transcribed from ambient dictation for Faisal Mar MD by Cira Nuñez.  10/12/23   10:06 EDT    Patient  or patient representative verbalized consent to the visit recording.  I have personally performed the services described in this document as transcribed by the above individual, and it is both accurate and complete.  Faisal Mar MD  10/12/2023  14:07 EDT

## 2023-10-17 DIAGNOSIS — G62.9 NEUROPATHY: ICD-10-CM

## 2023-10-17 LAB — VIT B12 SERPL-MCNC: 648 PG/ML (ref 211–946)

## 2023-10-23 ENCOUNTER — HOSPITAL ENCOUNTER (OUTPATIENT)
Dept: CARDIOLOGY | Facility: HOSPITAL | Age: 73
Discharge: HOME | End: 2023-10-23
Payer: MEDICARE

## 2023-10-23 VITALS — WEIGHT: 175 LBS | HEIGHT: 71 IN | BODY MASS INDEX: 24.5 KG/M2

## 2023-10-23 DIAGNOSIS — R42 DIZZINESS: ICD-10-CM

## 2023-10-23 DIAGNOSIS — E78.2 MIXED HYPERLIPIDEMIA: ICD-10-CM

## 2023-10-23 DIAGNOSIS — I10 ESSENTIAL HYPERTENSION: ICD-10-CM

## 2023-10-23 DIAGNOSIS — R07.89 CHEST PAIN, ATYPICAL: ICD-10-CM

## 2023-10-23 LAB
BH CV REST NUCLEAR ISOTOPE DOSE: 9.7 MCI
BH CV STRESS BP STAGE 1: NORMAL
BH CV STRESS BP STAGE 2: NORMAL
BH CV STRESS DURATION MIN STAGE 1: 3
BH CV STRESS DURATION MIN STAGE 2: 2
BH CV STRESS DURATION SEC STAGE 1: 0
BH CV STRESS DURATION SEC STAGE 2: 43
BH CV STRESS GRADE STAGE 1: 10
BH CV STRESS GRADE STAGE 2: 12
BH CV STRESS HR STAGE 1: 126
BH CV STRESS HR STAGE 2: 150
BH CV STRESS METS STAGE 1: 5
BH CV STRESS METS STAGE 2: 7.5
BH CV STRESS NUCLEAR ISOTOPE DOSE: 27.4 MCI
BH CV STRESS O2 STAGE 1: 95
BH CV STRESS O2 STAGE 2: 97
BH CV STRESS PROTOCOL 1: NORMAL
BH CV STRESS RECOVERY BP: NORMAL MMHG
BH CV STRESS RECOVERY HR: 100 BPM
BH CV STRESS RECOVERY O2: 98 %
BH CV STRESS SPEED STAGE 1: 1.7
BH CV STRESS SPEED STAGE 2: 2.5
BH CV STRESS STAGE 1: 1
BH CV STRESS STAGE 2: 2
BH CV XLRA MEAS LEFT DIST CCA EDV: 13.4 CM/SEC
BH CV XLRA MEAS LEFT DIST CCA PSV: 55.2 CM/SEC
BH CV XLRA MEAS LEFT DIST ICA EDV: 29.6 CM/SEC
BH CV XLRA MEAS LEFT DIST ICA PSV: 104 CM/SEC
BH CV XLRA MEAS LEFT ICA/CCA RATIO: 0.81
BH CV XLRA MEAS LEFT MID CCA EDV: 18.3 CM/SEC
BH CV XLRA MEAS LEFT MID CCA PSV: 67.5 CM/SEC
BH CV XLRA MEAS LEFT MID ICA EDV: 17 CM/SEC
BH CV XLRA MEAS LEFT MID ICA PSV: 46.9 CM/SEC
BH CV XLRA MEAS LEFT PROX CCA EDV: 15.7 CM/SEC
BH CV XLRA MEAS LEFT PROX CCA PSV: 75.4 CM/SEC
BH CV XLRA MEAS LEFT PROX ECA EDV: 13.2 CM/SEC
BH CV XLRA MEAS LEFT PROX ECA PSV: 60.9 CM/SEC
BH CV XLRA MEAS LEFT PROX ICA EDV: 20.1 CM/SEC
BH CV XLRA MEAS LEFT PROX ICA PSV: 54.8 CM/SEC
BH CV XLRA MEAS LEFT PROX SCLA PSV: 173 CM/SEC
BH CV XLRA MEAS LEFT VERTEBRAL A EDV: 13.8 CM/SEC
BH CV XLRA MEAS LEFT VERTEBRAL A PSV: 39.8 CM/SEC
BH CV XLRA MEAS RIGHT CAROTID BULB EDV: 13.7 CM/SEC
BH CV XLRA MEAS RIGHT CAROTID BULB PSV: 39.6 CM/SEC
BH CV XLRA MEAS RIGHT DIST CCA EDV: 18.4 CM/SEC
BH CV XLRA MEAS RIGHT DIST CCA PSV: 56.4 CM/SEC
BH CV XLRA MEAS RIGHT DIST ICA EDV: 22.2 CM/SEC
BH CV XLRA MEAS RIGHT DIST ICA PSV: 68.6 CM/SEC
BH CV XLRA MEAS RIGHT ICA/CCA RATIO: 0.7
BH CV XLRA MEAS RIGHT MID CCA EDV: 20.6 CM/SEC
BH CV XLRA MEAS RIGHT MID CCA PSV: 71.3 CM/SEC
BH CV XLRA MEAS RIGHT MID ICA EDV: 19.2 CM/SEC
BH CV XLRA MEAS RIGHT MID ICA PSV: 49.4 CM/SEC
BH CV XLRA MEAS RIGHT PROX CCA EDV: 16.5 CM/SEC
BH CV XLRA MEAS RIGHT PROX CCA PSV: 114 CM/SEC
BH CV XLRA MEAS RIGHT PROX ECA EDV: 28 CM/SEC
BH CV XLRA MEAS RIGHT PROX ECA PSV: 95.7 CM/SEC
BH CV XLRA MEAS RIGHT PROX ICA EDV: 18.4 CM/SEC
BH CV XLRA MEAS RIGHT PROX ICA PSV: 49.8 CM/SEC
BH CV XLRA MEAS RIGHT PROX SCLA PSV: 155 CM/SEC
BH CV XLRA MEAS RIGHT VERTEBRAL A EDV: 8.3 CM/SEC
BH CV XLRA MEAS RIGHT VERTEBRAL A PSV: 31.5 CM/SEC
LEFT ARM BP: NORMAL MMHG
LV EF NUC BP: 77 %
MAXIMAL PREDICTED HEART RATE: 149 BPM
PERCENT MAX PREDICTED HR: 101.34 %
RIGHT ARM BP: NORMAL MMHG
STRESS BASELINE BP: NORMAL MMHG
STRESS BASELINE HR: 66 BPM
STRESS O2 SAT REST: 99 %
STRESS PERCENT HR: 119 %
STRESS POST ESTIMATED WORKLOAD: 7 METS
STRESS POST EXERCISE DUR MIN: 5 MIN
STRESS POST EXERCISE DUR SEC: 43 SEC
STRESS POST O2 SAT PEAK: 97 %
STRESS POST PEAK BP: NORMAL MMHG
STRESS POST PEAK HR: 151 BPM
STRESS TARGET HR: 127 BPM

## 2023-10-23 PROCEDURE — 93018 CV STRESS TEST I&R ONLY: CPT | Performed by: INTERNAL MEDICINE

## 2023-10-23 PROCEDURE — 93880 EXTRACRANIAL BILAT STUDY: CPT | Performed by: INTERNAL MEDICINE

## 2023-10-23 PROCEDURE — A9500 TC99M SESTAMIBI: HCPCS | Performed by: PHYSICIAN ASSISTANT

## 2023-10-23 PROCEDURE — 78452 HT MUSCLE IMAGE SPECT MULT: CPT

## 2023-10-23 PROCEDURE — 0 TECHNETIUM SESTAMIBI: Performed by: PHYSICIAN ASSISTANT

## 2023-10-23 PROCEDURE — 78452 HT MUSCLE IMAGE SPECT MULT: CPT | Performed by: INTERNAL MEDICINE

## 2023-10-23 PROCEDURE — 93880 EXTRACRANIAL BILAT STUDY: CPT

## 2023-10-23 PROCEDURE — 93017 CV STRESS TEST TRACING ONLY: CPT

## 2023-10-23 RX ADMIN — TECHNETIUM TC 99M SESTAMIBI 1 DOSE: 1 INJECTION INTRAVENOUS at 12:55

## 2023-10-23 RX ADMIN — TECHNETIUM TC 99M SESTAMIBI 1 DOSE: 1 INJECTION INTRAVENOUS at 10:45

## 2023-11-02 ENCOUNTER — TELEPHONE (OUTPATIENT)
Dept: FAMILY MEDICINE CLINIC | Facility: CLINIC | Age: 73
End: 2023-11-02
Payer: MEDICARE

## 2023-11-02 DIAGNOSIS — G60.9 IDIOPATHIC PERIPHERAL NEUROPATHY: Primary | ICD-10-CM

## 2023-11-02 NOTE — TELEPHONE ENCOUNTER
Caller: Lanie Ruiz    Relationship: Self    Best call back number: [phone]     Caller requesting test results: PATIENT    What test was performed: NERVE CONDUCTION    When was the test performed: LAST WEEK, POSSIBLY TUESDAY    Where was the test performed: [facility]    Additional notes:

## 2023-11-02 NOTE — TELEPHONE ENCOUNTER
Pt informed and understood-ok with referral would like to go to [place] and needs appt before Thanksgiving due to leaving for [place]

## 2023-11-02 NOTE — TELEPHONE ENCOUNTER
Please call.  The nerve conduction study shows a severe peripheral neuropathy.  Since we have not been able to determine the cause through the testing that we have done, I would like to refer her to a neurologist for evaluation.  Please let me know once notified and I will place that referral.    It is not coming from any type of back issue but it is more in the peripheral nerves far from the spine.

## 2023-11-10 ENCOUNTER — LAB (OUTPATIENT)
Dept: LAB | Facility: HOSPITAL | Age: 73
End: 2023-11-10
Payer: MEDICARE

## 2023-11-10 ENCOUNTER — OFFICE VISIT (OUTPATIENT)
Dept: NEUROLOGY | Facility: CLINIC | Age: 73
End: 2023-11-10
Payer: MEDICARE

## 2023-11-10 VITALS
DIASTOLIC BLOOD PRESSURE: 74 MMHG | SYSTOLIC BLOOD PRESSURE: 126 MMHG | WEIGHT: 174.4 LBS | HEART RATE: 70 BPM | OXYGEN SATURATION: 98 % | BODY MASS INDEX: 24.42 KG/M2

## 2023-11-10 DIAGNOSIS — G62.9 PERIPHERAL POLYNEUROPATHY: Primary | ICD-10-CM

## 2023-11-10 DIAGNOSIS — G62.9 PERIPHERAL POLYNEUROPATHY: ICD-10-CM

## 2023-11-10 DIAGNOSIS — R53.83 FATIGUE, UNSPECIFIED TYPE: ICD-10-CM

## 2023-11-10 PROBLEM — C44.92 SQUAMOUS CELL CARCINOMA OF SKIN: Status: ACTIVE | Noted: 2018-07-13

## 2023-11-10 PROBLEM — J44.9 CHRONIC OBSTRUCTIVE LUNG DISEASE: Status: ACTIVE | Noted: 2018-07-13

## 2023-11-10 PROBLEM — Z87.42 HX OF ABNORMAL CERVICAL PAP SMEAR: Status: ACTIVE | Noted: 2023-11-10

## 2023-11-10 PROBLEM — G47.33 OBSTRUCTIVE SLEEP APNEA SYNDROME: Status: ACTIVE | Noted: 2018-07-13

## 2023-11-10 PROBLEM — F17.200 TOBACCO USE DISORDER: Status: ACTIVE | Noted: 2023-07-18

## 2023-11-10 PROCEDURE — 36415 COLL VENOUS BLD VENIPUNCTURE: CPT

## 2023-11-10 PROCEDURE — 83825 ASSAY OF MERCURY: CPT

## 2023-11-10 PROCEDURE — 82300 ASSAY OF CADMIUM: CPT

## 2023-11-10 PROCEDURE — 86200 CCP ANTIBODY: CPT

## 2023-11-10 PROCEDURE — 82784 ASSAY IGA/IGD/IGG/IGM EACH: CPT

## 2023-11-10 PROCEDURE — 84443 ASSAY THYROID STIM HORMONE: CPT

## 2023-11-10 PROCEDURE — 82550 ASSAY OF CK (CPK): CPT

## 2023-11-10 PROCEDURE — 86038 ANTINUCLEAR ANTIBODIES: CPT

## 2023-11-10 PROCEDURE — 82595 ASSAY OF CRYOGLOBULIN: CPT

## 2023-11-10 PROCEDURE — 86140 C-REACTIVE PROTEIN: CPT

## 2023-11-10 PROCEDURE — 85025 COMPLETE CBC W/AUTO DIFF WBC: CPT

## 2023-11-10 PROCEDURE — 83655 ASSAY OF LEAD: CPT

## 2023-11-10 PROCEDURE — 86617 LYME DISEASE ANTIBODY: CPT

## 2023-11-10 PROCEDURE — 86334 IMMUNOFIX E-PHORESIS SERUM: CPT

## 2023-11-10 PROCEDURE — 1159F MED LIST DOCD IN RCRD: CPT | Performed by: NURSE PRACTITIONER

## 2023-11-10 PROCEDURE — 86431 RHEUMATOID FACTOR QUANT: CPT

## 2023-11-10 PROCEDURE — 84165 PROTEIN E-PHORESIS SERUM: CPT

## 2023-11-10 PROCEDURE — 3078F DIAST BP <80 MM HG: CPT | Performed by: NURSE PRACTITIONER

## 2023-11-10 PROCEDURE — 99214 OFFICE O/P EST MOD 30 MIN: CPT | Performed by: NURSE PRACTITIONER

## 2023-11-10 PROCEDURE — 82175 ASSAY OF ARSENIC: CPT

## 2023-11-10 PROCEDURE — 85652 RBC SED RATE AUTOMATED: CPT

## 2023-11-10 PROCEDURE — 84155 ASSAY OF PROTEIN SERUM: CPT

## 2023-11-10 PROCEDURE — 3074F SYST BP LT 130 MM HG: CPT | Performed by: NURSE PRACTITIONER

## 2023-11-10 PROCEDURE — 1160F RVW MEDS BY RX/DR IN RCRD: CPT | Performed by: NURSE PRACTITIONER

## 2023-11-10 PROCEDURE — 82570 ASSAY OF URINE CREATININE: CPT

## 2023-11-10 NOTE — PROGRESS NOTES
Neuro Office Visit      Encounter Date: 11/10/2023   Patient Name: Lanie Ruiz  : 1950   MRN: [id number]   PCP: Dr Mar  Chief Complaint:    Chief Complaint   Patient presents with   • Idiopathic peripheral neuropathy       History of Present Illness: Lanie Ruiz is a 72 y.o. female who is here today in Neurology for  peripheral neuropathy.    Peripheral neuropathy  2018 pt developed tingling sensation in left foot and now in bilat feet up to calves. Feels her balance is off in the shower. Makes it difficult to stand for long periods of time. Her feet feel cold. Toes have started to curl under. No wounds. Will have occasional electric shock like sensation in toes and right lateral leg. Some fatigue.  Also with pain in right lateral calf while resting in bed. Ache type sensation.    PAD test years ago with vein surgery on RLE , hip surg in 2016. Has history of sciatica that is intermittent and this does not resemble that pain.    Denies known exposures to toxins, or chemo. Did have radiation  x4 weeks  for squamous cell of LLE. Denies tick Bites. She is retired and will have 3-4 drinks of liquor per week. Denies missing meals, back injuries. FH of Neuropathy in brother who was an alcoholic/      SCANNED - PROCEDURE (10/26/2023) -Mod to severe distal sensorimotor axonal neuropathy bilat  Vit b12-nml    Stress Test With Myocardial Perfusion One Day (10/23/2023 12:42)-borderline ecg with heavy calcification on CT images.  Duplex Carotid Ultrasound CAR (10/23/2023 15:23) mild plaque bilat w no significant stenosis.  PMH: sciatica, pre-diabetic years ago, OMID, squamous cell carcinoma  FH: retired. Spends half of her time in [place] and half in [place].    Subjective      Past Medical History:   Past Medical History:   Diagnosis Date   • Arthritis    • Cancer 2023    Squamous cell   • Cataract    • COPD (chronic obstructive pulmonary disease)    • Enlarged lymph node in neck     near trachea   •  Gall stones    • Hearing loss    • Hyperlipidemia    • Hypertension    • Sciatic pain        Past Surgical History:   Past Surgical History:   Procedure Laterality Date   • BREAST SURGERY      Neg biopsies   • CATARACT EXTRACTION, BILATERAL     •  SECTION     • COLONOSCOPY     • JOINT REPLACEMENT  2017    L hip   • MOHS SURGERY     • TOTAL HIP ARTHROPLASTY Right    • TUBAL ABDOMINAL LIGATION         Family History:   Family History   Problem Relation Age of Onset   • Heart disease Mother    • Hypertension Mother    • Stroke Mother    • Heart attack Mother    • Diabetes Father    • Hypertension Father    • Hyperlipidemia Father    • Stroke Brother    • Hypertension Brother    • Cancer Brother        Social History:   Social History     Socioeconomic History   • Marital status:    Tobacco Use   • Smoking status: Every Day     Packs/day: 0.50     Years: 40.00     Additional pack years: 0.00     Total pack years: 20.00     Types: Cigarettes     Last attempt to quit: 2023     Years since quittin.4   • Smokeless tobacco: Never   Vaping Use   • Vaping Use: Never used   • Passive vaping exposure: Yes   Substance and Sexual Activity   • Alcohol use: Yes     Alcohol/week: 3.0 - 4.0 standard drinks of alcohol     Types: 3 - 4 Shots of liquor per week     Comment: 3-4/week   • Drug use: No   • Sexual activity: Defer       Medications:     Current Outpatient Medications:   •  amLODIPine (NORVASC) 5 MG tablet, Take 1 tablet by mouth Daily., Disp: , Rfl:   •  Ascorbic Acid (Vitamin C) 500 MG capsule, Take  by mouth., Disp: , Rfl:   •  aspirin (Aspirin Adult Low Dose) 81 MG EC tablet, , Disp: , Rfl:   •  atorvastatin (Lipitor) 80 MG tablet, Take 1 tablet by mouth Every Night., Disp: 90 tablet, Rfl: 1  •  Cholecalciferol (Vitamin D-3) 125 MCG (5000 UT) tablet, Vitamin D3, Disp: , Rfl:   •  Cyanocobalamin (VITAMIN B 12 PO), Take  by mouth., Disp: , Rfl:   •  fluticasone (FLONASE) 50 MCG/ACT  nasal spray, 2 sprays into the nostril(s) as directed by provider Daily., Disp: 15.8 mL, Rfl: 0  •  losartan (COZAAR) 25 MG tablet, Take 1 tablet by mouth Daily., Disp: , Rfl:     Allergies:   Allergies   Allergen Reactions   • Latex Rash       PHQ-9 Total Score:     STEKHANG Fall Risk Assessment was completed, and patient is at HIGH risk for falls. Assessment completed on:11/10/2023    Objective     Physical Exam:   Physical Exam  Neurological:      Mental Status: She is oriented to person, place, and time.      Coordination: Romberg Test abnormal. Finger-Nose-Finger Test normal.      Gait: Gait is intact. Tandem walk abnormal.      Deep Tendon Reflexes:      Reflex Scores:       Tricep reflexes are 2+ on the right side and 2+ on the left side.       Bicep reflexes are 2+ on the right side and 2+ on the left side.       Brachioradialis reflexes are 2+ on the right side and 2+ on the left side.       Patellar reflexes are 1+ on the right side and 1+ on the left side.       Achilles reflexes are 1+ on the right side and 1+ on the left side.  Psychiatric:         Speech: Speech normal.         Neurologic Exam     Mental Status   Oriented to person, place, and time.   Follows 3 step commands.   Attention: normal. Concentration: normal.   Speech: speech is normal   Level of consciousness: alert  Knowledge: consistent with education.   Normal comprehension.     Cranial Nerves     CN III, IV, VI   Right pupil: Accommodation: intact.   Left pupil: Accommodation: intact.   CN III: no CN III palsy  CN VI: no CN VI palsy  Nystagmus: none   Diplopia: none  Upgaze: normal  Downgaze: normal  Conjugate gaze: present    CN VII   Facial expression full, symmetric.     CN VIII   Hearing: intact    CN XII   CN XII normal.     Motor Exam   Muscle bulk: normal  Overall muscle tone: normal    Strength   Right biceps: 5/5  Left biceps: 5/5  Right triceps: 5/5  Left triceps: 5/5  Right interossei: 5/5  Left interossei: 5/5  Right  quadriceps: 5/5  Left quadriceps: 5/5  Right anterior tibial: 5/5  Left anterior tibial: 5/5  Right posterior tibial: 5/5  Left posterior tibial: 5/5Toes are curled. No wounds. +2 DP and PD pulses.     Sensory Exam   Right arm light touch: normal  Left arm light touch: decreased from elbow  Right leg light touch: decreased from toes  Left leg light touch: decreased from toes  Right arm vibration: normal  Left arm vibration: normal  Right leg vibration: decreased from toes  Left leg vibration: decreased from toes  Decreased vibratory sensation from toes to calves bilat     Gait, Coordination, and Reflexes     Gait  Gait: normal    Coordination   Romberg: positive  Finger to nose coordination: normal  Tandem walking coordination: abnormal    Tremor   Resting tremor: absent  Action tremor: absent    Reflexes   Right brachioradialis: 2+  Left brachioradialis: 2+  Right biceps: 2+  Left biceps: 2+  Right triceps: 2+  Left triceps: 2+  Right patellar: 1+  Left patellar: 1+  Right achilles: 1+  Left achilles: 1+  Right : 2+  Left : 2+       Vital Signs:   Vitals:    11/10/23 1316   BP: 126/74   Pulse: 70   SpO2: 98%   Weight: 79.1 kg (174 lb 6.4 oz)     Body mass index is 24.42 kg/m².     Results:         Assessment / Plan      Assessment/Plan:   Diagnoses and all orders for this visit:    1. Peripheral polyneuropathy (Primary)  Comments:  Check labs. If all neg. Will order MRI brain and c-spine  Orders:  -     CBC & Differential; Future  -     CK; Future  -     C-reactive Protein; Future  -     Cryoglobulin; Future  -     Heavy Metals Profile II, Urine - Urine, Clean Catch; Future  -     LAURA + PE; Future  -     Lyme Disease, Line Blot; Future  -     Rheumatoid Arthritis (RA) Profile; Future  -     Sedimentation Rate; Future  -     LES by IFA, Reflex 9-biomarkers profile; Future  -     TSH; Future    2. Fatigue, unspecified type  -     TSH; Future     Discussed common causes of neuropathy. Will check labs. If labs  neg will order MRI brain and c-spine.  Recommend stop etoh use.        Patient Education:     Reviewed medications, potential side effects and signs and symptoms to report. Discussed risk versus benefits of treatment plan with patient and/or family-including medications, labs and radiology that may be ordered. Addressed questions and concerns during visit. Patient and/or family verbalized understanding and agree with plan. Instructed to call the office with any questions and report to ER with any life-threatening symptoms.     Follow Up:   Pt is leaving for [place] in 2 weeks. Will call with lab results.     During this visit the following were done:  Labs Reviewed [x]    Labs Ordered [x]    Radiology Reports Reviewed []    Radiology Ordered []    PCP Records Reviewed [x]    Referring Provider Records Reviewed []    ER Records Reviewed []    Hospital Records Reviewed []    History Obtained From Family []    Radiology Images Reviewed []    Other Reviewed [x]    Records Requested []      Bridgett Green, KINGS, APRN

## 2023-11-10 NOTE — LETTER
November 10, 2023     Faisal Mar MD  [address]  [place]  [place] [address]    Patient: Lanie Ruiz   YOB: 1950   Date of Visit: 11/10/2023     Dear Faisal Mar MD:       Thank you for referring Lanie Ruiz to me for evaluation. Below are the relevant portions of my assessment and plan of care.    If you have questions, please do not hesitate to call me. I look forward to following Lanie along with you.         Sincerely,        Bridgett Green DNP, APRN        CC: No Recipients    Bridgett Green DNP, APRN  11/10/23 1428  Signed     Neuro Office Visit      Encounter Date: 11/10/2023   Patient Name: Lanie Ruiz  : 1950   MRN: [id number]   PCP: Dr Mar  Chief Complaint:    Chief Complaint   Patient presents with   • Idiopathic peripheral neuropathy       History of Present Illness: Lanie Ruiz is a 72 y.o. female who is here today in Neurology for  peripheral neuropathy.    Peripheral neuropathy  2018 pt developed tingling sensation in left foot and now in bilat feet up to calves. Feels her balance is off in the shower. Makes it difficult to stand for long periods of time. Her feet feel cold. Toes have started to curl under. No wounds. Will have occasional electric shock like sensation in toes and right lateral leg. Some fatigue.  Also with pain in right lateral calf while resting in bed. Ache type sensation.    PAD test years ago with vein surgery on RLE , hip surg in 2016. Has history of sciatica that is intermittent and this does not resemble that pain.    Denies known exposures to toxins, or chemo. Did have radiation  x4 weeks  for squamous cell of LLE. Denies tick Bites. She is retired and will have 3-4 drinks of liquor per week. Denies missing meals, back injuries. FH of Neuropathy in brother who was an alcoholic/      SCANNED - PROCEDURE (10/26/2023) -Mod to severe distal sensorimotor axonal neuropathy bilat  Vit b12-nml    Stress Test With Myocardial  Perfusion One Day (10/23/2023 12:42)-borderline ecg with heavy calcification on CT images.  Duplex Carotid Ultrasound CAR (10/23/2023 15:23) mild plaque bilat w no significant stenosis.  PMH: sciatica, pre-diabetic years ago, OMID, squamous cell carcinoma  FH: retired. Spends half of her time in [place] and half in [place].    Subjective      Past Medical History:   Past Medical History:   Diagnosis Date   • Arthritis    • Cancer 2023    Squamous cell   • Cataract    • COPD (chronic obstructive pulmonary disease)    • Enlarged lymph node in neck     near trachea   • Gall stones    • Hearing loss    • Hyperlipidemia    • Hypertension    • Sciatic pain        Past Surgical History:   Past Surgical History:   Procedure Laterality Date   • BREAST SURGERY      Neg biopsies   • CATARACT EXTRACTION, BILATERAL     •  SECTION     • COLONOSCOPY     • JOINT REPLACEMENT      L hip   • MOHS SURGERY     • TOTAL HIP ARTHROPLASTY Right    • TUBAL ABDOMINAL LIGATION         Family History:   Family History   Problem Relation Age of Onset   • Heart disease Mother    • Hypertension Mother    • Stroke Mother    • Heart attack Mother    • Diabetes Father    • Hypertension Father    • Hyperlipidemia Father    • Stroke Brother    • Hypertension Brother    • Cancer Brother        Social History:   Social History     Socioeconomic History   • Marital status:    Tobacco Use   • Smoking status: Every Day     Packs/day: 0.50     Years: 40.00     Additional pack years: 0.00     Total pack years: 20.00     Types: Cigarettes     Last attempt to quit: 2023     Years since quittin.4   • Smokeless tobacco: Never   Vaping Use   • Vaping Use: Never used   • Passive vaping exposure: Yes   Substance and Sexual Activity   • Alcohol use: Yes     Alcohol/week: 3.0 - 4.0 standard drinks of alcohol     Types: 3 - 4 Shots of liquor per week     Comment: 3-4/week   • Drug use: No   • Sexual activity: Defer        Medications:     Current Outpatient Medications:   •  amLODIPine (NORVASC) 5 MG tablet, Take 1 tablet by mouth Daily., Disp: , Rfl:   •  Ascorbic Acid (Vitamin C) 500 MG capsule, Take  by mouth., Disp: , Rfl:   •  aspirin (Aspirin Adult Low Dose) 81 MG EC tablet, , Disp: , Rfl:   •  atorvastatin (Lipitor) 80 MG tablet, Take 1 tablet by mouth Every Night., Disp: 90 tablet, Rfl: 1  •  Cholecalciferol (Vitamin D-3) 125 MCG (5000 UT) tablet, Vitamin D3, Disp: , Rfl:   •  Cyanocobalamin (VITAMIN B 12 PO), Take  by mouth., Disp: , Rfl:   •  fluticasone (FLONASE) 50 MCG/ACT nasal spray, 2 sprays into the nostril(s) as directed by provider Daily., Disp: 15.8 mL, Rfl: 0  •  losartan (COZAAR) 25 MG tablet, Take 1 tablet by mouth Daily., Disp: , Rfl:     Allergies:   Allergies   Allergen Reactions   • Latex Rash       PHQ-9 Total Score:     STEADI Fall Risk Assessment was completed, and patient is at HIGH risk for falls. Assessment completed on:11/10/2023    Objective     Physical Exam:   Physical Exam  Neurological:      Mental Status: She is oriented to person, place, and time.      Coordination: Romberg Test abnormal. Finger-Nose-Finger Test normal.      Gait: Gait is intact. Tandem walk abnormal.      Deep Tendon Reflexes:      Reflex Scores:       Tricep reflexes are 2+ on the right side and 2+ on the left side.       Bicep reflexes are 2+ on the right side and 2+ on the left side.       Brachioradialis reflexes are 2+ on the right side and 2+ on the left side.       Patellar reflexes are 1+ on the right side and 1+ on the left side.       Achilles reflexes are 1+ on the right side and 1+ on the left side.  Psychiatric:         Speech: Speech normal.         Neurologic Exam     Mental Status   Oriented to person, place, and time.   Follows 3 step commands.   Attention: normal. Concentration: normal.   Speech: speech is normal   Level of consciousness: alert  Knowledge: consistent with education.   Normal  comprehension.     Cranial Nerves     CN III, IV, VI   Right pupil: Accommodation: intact.   Left pupil: Accommodation: intact.   CN III: no CN III palsy  CN VI: no CN VI palsy  Nystagmus: none   Diplopia: none  Upgaze: normal  Downgaze: normal  Conjugate gaze: present    CN VII   Facial expression full, symmetric.     CN VIII   Hearing: intact    CN XII   CN XII normal.     Motor Exam   Muscle bulk: normal  Overall muscle tone: normal    Strength   Right biceps: 5/5  Left biceps: 5/5  Right triceps: 5/5  Left triceps: 5/5  Right interossei: 5/5  Left interossei: 5/5  Right quadriceps: 5/5  Left quadriceps: 5/5  Right anterior tibial: 5/5  Left anterior tibial: 5/5  Right posterior tibial: 5/5  Left posterior tibial: 5/5Toes are curled. No wounds. +2 DP and PD pulses.     Sensory Exam   Right arm light touch: normal  Left arm light touch: decreased from elbow  Right leg light touch: decreased from toes  Left leg light touch: decreased from toes  Right arm vibration: normal  Left arm vibration: normal  Right leg vibration: decreased from toes  Left leg vibration: decreased from toes  Decreased vibratory sensation from toes to calves bilat     Gait, Coordination, and Reflexes     Gait  Gait: normal    Coordination   Romberg: positive  Finger to nose coordination: normal  Tandem walking coordination: abnormal    Tremor   Resting tremor: absent  Action tremor: absent    Reflexes   Right brachioradialis: 2+  Left brachioradialis: 2+  Right biceps: 2+  Left biceps: 2+  Right triceps: 2+  Left triceps: 2+  Right patellar: 1+  Left patellar: 1+  Right achilles: 1+  Left achilles: 1+  Right : 2+  Left : 2+       Vital Signs:   Vitals:    11/10/23 1316   BP: 126/74   Pulse: 70   SpO2: 98%   Weight: 79.1 kg (174 lb 6.4 oz)     Body mass index is 24.42 kg/m².     Results:         Assessment / Plan      Assessment/Plan:   Diagnoses and all orders for this visit:    1. Peripheral polyneuropathy (Primary)  Comments:  Check  labs. If all neg. Will order MRI brain and c-spine  Orders:  -     CBC & Differential; Future  -     CK; Future  -     C-reactive Protein; Future  -     Cryoglobulin; Future  -     Heavy Metals Profile II, Urine - Urine, Clean Catch; Future  -     LAURA + PE; Future  -     Lyme Disease, Line Blot; Future  -     Rheumatoid Arthritis (RA) Profile; Future  -     Sedimentation Rate; Future  -     LES by IFA, Reflex 9-biomarkers profile; Future  -     TSH; Future    2. Fatigue, unspecified type  -     TSH; Future     Discussed common causes of neuropathy. Will check labs. If labs neg will order MRI brain and c-spine.  Recommend stop etoh use.        Patient Education:     Reviewed medications, potential side effects and signs and symptoms to report. Discussed risk versus benefits of treatment plan with patient and/or family-including medications, labs and radiology that may be ordered. Addressed questions and concerns during visit. Patient and/or family verbalized understanding and agree with plan. Instructed to call the office with any questions and report to ER with any life-threatening symptoms.     Follow Up:   Pt is leaving for [place] in 2 weeks. Will call with lab results.     During this visit the following were done:  Labs Reviewed [x]    Labs Ordered [x]    Radiology Reports Reviewed []    Radiology Ordered []    PCP Records Reviewed [x]    Referring Provider Records Reviewed []    ER Records Reviewed []    Hospital Records Reviewed []    History Obtained From Family []    Radiology Images Reviewed []    Other Reviewed [x]    Records Requested []      Bridgett Green, KINGS, APRN

## 2023-11-11 LAB
BASOPHILS # BLD AUTO: 0.07 10*3/MM3 (ref 0–0.2)
BASOPHILS NFR BLD AUTO: 1.2 % (ref 0–1.5)
CK SERPL-CCNC: 215 U/L (ref 20–180)
CRP SERPL-MCNC: <0.3 MG/DL (ref 0–0.5)
DEPRECATED RDW RBC AUTO: 42.8 FL (ref 37–54)
EOSINOPHIL # BLD AUTO: 0.11 10*3/MM3 (ref 0–0.4)
EOSINOPHIL NFR BLD AUTO: 1.9 % (ref 0.3–6.2)
ERYTHROCYTE [DISTWIDTH] IN BLOOD BY AUTOMATED COUNT: 12.9 % (ref 12.3–15.4)
ERYTHROCYTE [SEDIMENTATION RATE] IN BLOOD: 10 MM/HR (ref 0–30)
HCT VFR BLD AUTO: 42.9 % (ref 34–46.6)
HGB BLD-MCNC: 14.7 G/DL (ref 12–15.9)
IMM GRANULOCYTES # BLD AUTO: 0.01 10*3/MM3 (ref 0–0.05)
IMM GRANULOCYTES NFR BLD AUTO: 0.2 % (ref 0–0.5)
LYMPHOCYTES # BLD AUTO: 2.14 10*3/MM3 (ref 0.7–3.1)
LYMPHOCYTES NFR BLD AUTO: 37.6 % (ref 19.6–45.3)
MCH RBC QN AUTO: 31.3 PG (ref 26.6–33)
MCHC RBC AUTO-ENTMCNC: 34.3 G/DL (ref 31.5–35.7)
MCV RBC AUTO: 91.3 FL (ref 79–97)
MONOCYTES # BLD AUTO: 0.54 10*3/MM3 (ref 0.1–0.9)
MONOCYTES NFR BLD AUTO: 9.5 % (ref 5–12)
NEUTROPHILS NFR BLD AUTO: 2.82 10*3/MM3 (ref 1.7–7)
NEUTROPHILS NFR BLD AUTO: 49.6 % (ref 42.7–76)
NRBC BLD AUTO-RTO: 0 /100 WBC (ref 0–0.2)
PLATELET # BLD AUTO: 265 10*3/MM3 (ref 140–450)
PMV BLD AUTO: 11.7 FL (ref 6–12)
RBC # BLD AUTO: 4.7 10*6/MM3 (ref 3.77–5.28)
TSH SERPL DL<=0.05 MIU/L-ACNC: 1.55 UIU/ML (ref 0.27–4.2)
WBC NRBC COR # BLD: 5.69 10*3/MM3 (ref 3.4–10.8)

## 2023-11-13 LAB
ALBUMIN SERPL ELPH-MCNC: 4 G/DL (ref 2.9–4.4)
ALBUMIN/GLOB SERPL: 1.4 {RATIO} (ref 0.7–1.7)
ALPHA1 GLOB SERPL ELPH-MCNC: 0.3 G/DL (ref 0–0.4)
ALPHA2 GLOB SERPL ELPH-MCNC: 0.9 G/DL (ref 0.4–1)
ANA SER QL IF: NEGATIVE
B-GLOBULIN SERPL ELPH-MCNC: 1.1 G/DL (ref 0.7–1.3)
CCP IGA+IGG SERPL IA-ACNC: 0 UNITS (ref 0–19)
GAMMA GLOB SERPL ELPH-MCNC: 0.7 G/DL (ref 0.4–1.8)
GLOBULIN SER-MCNC: 3 G/DL (ref 2.2–3.9)
IGA SERPL-MCNC: 145 MG/DL (ref 64–422)
IGG SERPL-MCNC: 625 MG/DL (ref 586–1602)
IGM SERPL-MCNC: 78 MG/DL (ref 26–217)
INTERPRETATION SERPL IEP-IMP: NORMAL
LABORATORY COMMENT REPORT: NORMAL
LABORATORY COMMENT REPORT: NORMAL
M PROTEIN SERPL ELPH-MCNC: NORMAL G/DL
PROT SERPL-MCNC: 7 G/DL (ref 6–8.5)
RHEUMATOID FACT SERPL-ACNC: 10.7 IU/ML

## 2023-11-14 LAB
ARSENIC UR-MCNC: NORMAL UG/L (ref 0–9)
CADMIUM 24H UR-MCNC: NORMAL UG/L
CREAT UR-MCNC: 0.33 G/L (ref 0.3–3)
LEAD 24H UR-MCNC: NORMAL UG/L (ref 0–49)
MERCURY 24H UR-MCNC: NORMAL UG/L (ref 0–19)

## 2023-11-15 LAB
B BURGDOR IGG PATRN SER IB-IMP: NEGATIVE
B BURGDOR IGM PATRN SER IB-IMP: NEGATIVE
B BURGDOR18KD IGG SER QL IB: NORMAL
B BURGDOR23KD IGG SER QL IB: NORMAL
B BURGDOR23KD IGM SER QL IB: NORMAL
B BURGDOR28KD IGG SER QL IB: NORMAL
B BURGDOR30KD IGG SER QL IB: NORMAL
B BURGDOR39KD IGG SER QL IB: NORMAL
B BURGDOR39KD IGM SER QL IB: NORMAL
B BURGDOR41KD IGG SER QL IB: NORMAL
B BURGDOR41KD IGM SER QL IB: NORMAL
B BURGDOR45KD IGG SER QL IB: NORMAL
B BURGDOR58KD IGG SER QL IB: NORMAL
B BURGDOR66KD IGG SER QL IB: NORMAL
B BURGDOR93KD IGG SER QL IB: NORMAL

## 2023-11-16 ENCOUNTER — TELEPHONE (OUTPATIENT)
Dept: NEUROLOGY | Facility: CLINIC | Age: 73
End: 2023-11-16
Payer: MEDICARE

## 2023-11-16 LAB — CRYOGLOB SER QL 1D COLD INC: NORMAL

## 2023-11-16 NOTE — TELEPHONE ENCOUNTER
Called patient and gave results.  Patient was understanding and appreciative.    ----- Message from Bridgett Green, KINGS, APRN sent at 11/16/2023  2:15 PM EST -----  Please notify pt labs for cryoglobulins, lyme disease, heavy metal toxins, connective tissue autoimmune diseases, immune labs, rheumatoid arthritis, inflammatory markers, thyroid and blood counts are all normal. Muscle breakdown was only slightly elevated and not concerning.

## 2023-11-16 NOTE — PROGRESS NOTES
Please notify pt labs for cryoglobulins, lyme disease, heavy metal toxins, connective tissue autoimmune diseases, immune labs, rheumatoid arthritis, inflammatory markers, thyroid and blood counts are all normal. Muscle breakdown was only slightly elevated and not concerning.

## 2023-11-21 ENCOUNTER — OFFICE VISIT (OUTPATIENT)
Dept: CARDIOLOGY | Facility: CLINIC | Age: 73
End: 2023-11-21
Payer: MEDICARE

## 2023-11-21 VITALS
DIASTOLIC BLOOD PRESSURE: 82 MMHG | HEART RATE: 68 BPM | BODY MASS INDEX: 25.57 KG/M2 | HEIGHT: 70 IN | WEIGHT: 178.6 LBS | OXYGEN SATURATION: 98 % | SYSTOLIC BLOOD PRESSURE: 130 MMHG

## 2023-11-21 DIAGNOSIS — R07.89 CHEST PAIN, ATYPICAL: Primary | ICD-10-CM

## 2023-11-21 PROCEDURE — 3075F SYST BP GE 130 - 139MM HG: CPT | Performed by: PHYSICIAN ASSISTANT

## 2023-11-21 PROCEDURE — 3079F DIAST BP 80-89 MM HG: CPT | Performed by: PHYSICIAN ASSISTANT

## 2023-11-21 PROCEDURE — 99213 OFFICE O/P EST LOW 20 MIN: CPT | Performed by: PHYSICIAN ASSISTANT

## 2023-11-21 PROCEDURE — 93000 ELECTROCARDIOGRAM COMPLETE: CPT | Performed by: PHYSICIAN ASSISTANT

## 2023-11-21 NOTE — PROGRESS NOTES
[facility]   OFFICE NOTE      Lanie Ruiz  1950  PCP: Faisal Mar MD    SUBJECTIVE:   Lanie Ruiz is a 72 y.o. female seen for a follow up visit regarding the following:     CC: Noncardiac chest pain.    HPI:   Pleasant 72-year-old female returns today follow-up regarding atypical chest pain.  She states she does not have any further pain in her neck.  Is complete resolved.  She underwent a stress test revealed no InSu ischemia in October.  She also had a carotid duplex scan for dizziness this revealed nonobstructive disease mild plaque in the carotid she is on high-dose statin.  She is instructed to stop smoking.  She states overall she is doing well.  She has had no further chest pain.  No dizziness no near-syncope or syncope.  She is working hard to quit smoking is cut back quite a bit.  She is scheduled go back to [place] this week and follow-up with her local physician in [place].    Cardiac PMH: (Old records have been reviewed and summarized below)  Noncardiac chest pain  Remote negative Stress MPS  Followed cardiologist in [place]  Echocardiogram [place], normal LV function mild calcification aortic valve no significant aortic stenosis.  No other significant valvular heart disease.  Stress test October 23, 2023 no evidence of ischemia normal LV function  HTN  Multiple Medications  Recently restarted Chlorithiadone  Dizziness-resolved.  Carotid duplex scan less than 50% stenosis.  Recommend continue high-dose statin Lipitor therapy, stop smoking  Mild Obesity  HLD  Tobacco abuse, 1/2 ppd   OA   Family History of CAD,Brother MI     Past Medical History, Past Surgical History, Family history, Social History, and Medications were all reviewed with the patient today and updated as necessary.       Current Outpatient Medications:     amLODIPine (NORVASC) 5 MG tablet, Take 1 tablet by mouth Daily., Disp: , Rfl:     Ascorbic Acid (Vitamin C) 500 MG capsule, Take  by  "mouth., Disp: , Rfl:     aspirin (Aspirin Adult Low Dose) 81 MG EC tablet, , Disp: , Rfl:     atorvastatin (Lipitor) 80 MG tablet, Take 1 tablet by mouth Every Night., Disp: 90 tablet, Rfl: 1    Cholecalciferol (Vitamin D-3) 125 MCG (5000 UT) tablet, Vitamin D3, Disp: , Rfl:     losartan (COZAAR) 25 MG tablet, Take 1 tablet by mouth Daily., Disp: , Rfl:     Cyanocobalamin (VITAMIN B 12 PO), Take  by mouth. (Patient not taking: Reported on 11/21/2023), Disp: , Rfl:       Allergies   Allergen Reactions    Latex Rash         PHYSICAL EXAM:    /82 (BP Location: Right arm, Patient Position: Sitting)   Pulse 68   Ht 177.8 cm (70\")   Wt 81 kg (178 lb 9.6 oz)   SpO2 98%   BMI 25.63 kg/m²        Wt Readings from Last 5 Encounters:   11/21/23 81 kg (178 lb 9.6 oz)   11/10/23 79.1 kg (174 lb 6.4 oz)   10/23/23 79.4 kg (175 lb)   10/12/23 79.4 kg (175 lb)   09/25/23 78.5 kg (173 lb)       BP Readings from Last 5 Encounters:   11/21/23 130/82   11/10/23 126/74   10/12/23 126/80   09/25/23 128/70   06/02/23 138/86       General appearance - Alert, well appearing, and in no distress   Mental status - Affect appropriate to mood.  Eyes - Sclerae anicteric,  ENMT - Hearing grossly normal bilaterally, Dental hygiene good.  Neck - Carotids upstroke normal bilaterally, no bruits, no JVD.  Resp - Clear to auscultation, no wheezes, rales or rhonchi, symmetric air entry.  Heart - Normal rate, regular rhythm, normal S1, S2, 2/6 systolic ejection murmur  GI - Soft, nontender, nondistended, no masses or organomegaly.  Neurological - Grossly intact - normal speech, no focal findings  Musculoskeletal - No joint tenderness, deformity or swelling, no muscular tenderness noted.  Extremities - Peripheral pulses normal, no pedal edema, no clubbing or cyanosis.  Skin - Normal coloration and turgor.  Psych -  oriented to person, place, and time.    Medical problems and test results were reviewed with the patient today.           EKG: (EKG " has been independently visualized by me and summarized below)    ECG 12 Lead    Date/Time: 11/21/2023 1:27 PM  Performed by: Carrington Marc PA    Authorized by: Carrington Marc PA  Comparison: compared with previous ECG from 9/25/2023  Similar to previous ECG  Rhythm: sinus rhythm  Rate: normal  Conduction: conduction normal  ST Segments: ST segments normal  QRS axis: normal    Clinical impression: normal ECG             ASSESSMENT   1.  Noncardiac chest pain: Negative stress test October 23, 2023    2.  Dizziness: Resolved, carotid duplex scan less than 50% stenosis bilaterally.  Continue statin therapy.    3.  Ongoing tobacco use: Discussed need for immediate cessation dangers not doing so    PLAN  Reviewed testing with patient.  Overall she is doing well.  She has no further angina.  Recommend continue focus on risk factors blood pressure control, statin therapy, stop smoking.  She is driving to [place] she would like to follow-up with her local cardiologist she will return follow-up or office as needed basis.      11/21/2023  10:59 EST    Electronically signed by AVELINA Aguayo, 11/21/23, 1:28 PM EST.

## 2024-04-10 DIAGNOSIS — E78.00 HYPERCHOLESTEREMIA: ICD-10-CM

## 2024-04-10 RX ORDER — ATORVASTATIN CALCIUM 80 MG/1
80 TABLET, FILM COATED ORAL NIGHTLY
Qty: 90 TABLET | Refills: 1 | Status: SHIPPED | OUTPATIENT
Start: 2024-04-10

## 2024-05-15 ENCOUNTER — OFFICE VISIT (OUTPATIENT)
Dept: FAMILY MEDICINE CLINIC | Facility: CLINIC | Age: 74
End: 2024-05-15
Payer: MEDICARE

## 2024-05-15 VITALS
BODY MASS INDEX: 26.48 KG/M2 | DIASTOLIC BLOOD PRESSURE: 72 MMHG | TEMPERATURE: 97.7 F | HEIGHT: 70 IN | OXYGEN SATURATION: 96 % | SYSTOLIC BLOOD PRESSURE: 138 MMHG | HEART RATE: 75 BPM | WEIGHT: 185 LBS

## 2024-05-15 DIAGNOSIS — J20.9 ACUTE BRONCHITIS, UNSPECIFIED ORGANISM: Primary | ICD-10-CM

## 2024-05-15 PROCEDURE — 99213 OFFICE O/P EST LOW 20 MIN: CPT | Performed by: PHYSICIAN ASSISTANT

## 2024-05-15 PROCEDURE — 1125F AMNT PAIN NOTED PAIN PRSNT: CPT | Performed by: PHYSICIAN ASSISTANT

## 2024-05-15 PROCEDURE — 3078F DIAST BP <80 MM HG: CPT | Performed by: PHYSICIAN ASSISTANT

## 2024-05-15 PROCEDURE — 3075F SYST BP GE 130 - 139MM HG: CPT | Performed by: PHYSICIAN ASSISTANT

## 2024-05-15 RX ORDER — DOXYCYCLINE HYCLATE 100 MG/1
100 CAPSULE ORAL 2 TIMES DAILY
Qty: 20 CAPSULE | Refills: 0 | Status: SHIPPED | OUTPATIENT
Start: 2024-05-15

## 2024-05-15 RX ORDER — BROMPHENIRAMINE MALEATE, PSEUDOEPHEDRINE HYDROCHLORIDE, AND DEXTROMETHORPHAN HYDROBROMIDE 2; 30; 10 MG/5ML; MG/5ML; MG/5ML
5 SYRUP ORAL 4 TIMES DAILY PRN
Qty: 200 ML | Refills: 0 | Status: SHIPPED | OUTPATIENT
Start: 2024-05-15

## 2024-05-15 RX ORDER — ALBUTEROL SULFATE 90 UG/1
2 AEROSOL, METERED RESPIRATORY (INHALATION) EVERY 4 HOURS PRN
Qty: 8 G | Refills: 2 | Status: SHIPPED | OUTPATIENT
Start: 2024-05-15

## 2024-05-15 RX ORDER — PREDNISONE 10 MG/1
TABLET ORAL
Qty: 21 EACH | Refills: 0 | Status: SHIPPED | OUTPATIENT
Start: 2024-05-15

## 2024-05-16 NOTE — PROGRESS NOTES
"Subjective   Lanie Ruiz is a 73 y.o. female.     Cough       Pt presents with CC of productive cough X 10 days. This is her first evaluation   Worse in the mornings   Also having rhinorrhea and wheezing   Getting HA from coughing in the evenings.   No significant sinus pressure.   Taking mucinex with little relief   Pt is a current smoker     The following portions of the patient's history were reviewed and updated as appropriate: allergies, current medications, past family history, past medical history, past social history, past surgical history, and problem list.    Review of Systems   Respiratory:  Positive for cough.      As noted per HPI     Objective   Blood pressure 138/72, pulse 75, temperature 97.7 °F (36.5 °C), height 177.8 cm (70\"), weight 83.9 kg (185 lb), SpO2 96%.     Physical Exam  Vitals and nursing note reviewed.   Constitutional:       Appearance: Normal appearance.   HENT:      Head: Normocephalic.      Right Ear: Tympanic membrane, ear canal and external ear normal.      Left Ear: Tympanic membrane, ear canal and external ear normal.      Nose: Nose normal.      Mouth/Throat:      Pharynx: No oropharyngeal exudate or posterior oropharyngeal erythema.   Eyes:      Conjunctiva/sclera: Conjunctivae normal.   Cardiovascular:      Rate and Rhythm: Normal rate and regular rhythm.   Pulmonary:      Breath sounds: Wheezing and rhonchi present.   Skin:     General: Skin is warm and dry.   Neurological:      Mental Status: She is alert and oriented to person, place, and time.   Psychiatric:         Mood and Affect: Mood normal.         Behavior: Behavior normal.         Assessment & Plan   Diagnoses and all orders for this visit:    1. Acute bronchitis, unspecified organism (Primary)  -     doxycycline (VIBRAMYCIN) 100 MG capsule; Take 1 capsule by mouth 2 (Two) Times a Day.  Dispense: 20 capsule; Refill: 0  -     predniSONE (DELTASONE) 10 MG (21) dose pack; Use as directed on package  Dispense: 21 " each; Refill: 0  -     brompheniramine-pseudoephedrine-DM 30-2-10 MG/5ML syrup; Take 5 mL by mouth 4 (Four) Times a Day As Needed for Congestion.  Dispense: 200 mL; Refill: 0  -     albuterol sulfate  (90 Base) MCG/ACT inhaler; Inhale 2 puffs Every 4 (Four) Hours As Needed for Wheezing or Shortness of Air.  Dispense: 8 g; Refill: 2    Treatment as outlined in plan   Suspect likely underlying COPD due to age and smoking hx. Follow with PCP to consider maintenance inhaler if having recurring breathing issues   Report to ER if difficulty breathing develops

## 2024-12-12 ENCOUNTER — OFFICE VISIT (OUTPATIENT)
Dept: CARDIOLOGY | Facility: CLINIC | Age: 74
End: 2024-12-12
Payer: MEDICARE

## 2024-12-12 VITALS
SYSTOLIC BLOOD PRESSURE: 142 MMHG | HEIGHT: 70 IN | WEIGHT: 177 LBS | OXYGEN SATURATION: 97 % | BODY MASS INDEX: 25.34 KG/M2 | HEART RATE: 73 BPM | DIASTOLIC BLOOD PRESSURE: 80 MMHG

## 2024-12-12 DIAGNOSIS — I10 PRIMARY HYPERTENSION: Primary | ICD-10-CM

## 2024-12-12 RX ORDER — LOSARTAN POTASSIUM 100 MG/1
100 TABLET ORAL DAILY
Qty: 90 TABLET | Refills: 3 | Status: SHIPPED | OUTPATIENT
Start: 2024-12-12

## 2024-12-12 NOTE — PROGRESS NOTES
[facility]   OFFICE NOTE      Lanie Ruiz  1950  PCP: Faisal Mar MD    SUBJECTIVE:   Lanie Ruiz is a 74 y.o. female seen for a follow up visit regarding the following:     CC: Noncardiac chest pain.    HPI:   Pleasant 74-year-old female returns today follow-up regarding recent increase in blood pressure.  She tells me she went to the dentist office as noted above pressure is high she started checking her blood pressure regular basis remaining 1 40-150mmg and occasionally 160 systolic.  She tells me has any further chest pain.  She denies any dizziness near syncope.  She does get shortness of breath she walks up stairs quickly but this resolves when she recovers.    Cardiac PMH: (Old records have been reviewed and summarized below)  Noncardiac chest pain  Remote negative Stress MPS  Followed cardiologist in [place]  [place]ocardiogram [place], normal LV function mild calcification aortic valve no significant aortic stenosis.  No other significant valvular heart disease.  Stress test October 23, 2023 no evidence of ischemia normal LV function  HTN  Multiple Medications  Recently restarted Chlorithiadone  Dizziness-resolved.  Carotid duplex scan less than 50% stenosis.  Recommend continue high-dose statin Lipitor therapy, stop smoking  Mild Obesity  HLD  Tobacco abuse, 1/2 ppd   OA   Family History of CAD,Brother MI     Past Medical History, Past Surgical History, Family history, Social History, and Medications were all reviewed with the patient today and updated as necessary.       Current Outpatient Medications:     amLODIPine (NORVASC) 5 MG tablet, Take 1 tablet by mouth Daily., Disp: , Rfl:     aspirin (Aspirin Adult Low Dose) 81 MG EC tablet, , Disp: , Rfl:     atorvastatin (LIPITOR) 80 MG tablet, TAKE 1 TABLET BY MOUTH EVERY NIGHT, Disp: 90 tablet, Rfl: 1    Cholecalciferol (Vitamin D-3) 125 MCG (5000 UT) tablet, Vitamin D3, Disp: , Rfl:     Cyanocobalamin (VITAMIN  "B 12 PO), Take  by mouth., Disp: , Rfl:     losartan (COZAAR) 25 MG tablet, Take 1 tablet by mouth Daily., Disp: , Rfl:     albuterol sulfate  (90 Base) MCG/ACT inhaler, Inhale 2 puffs Every 4 (Four) Hours As Needed for Wheezing or Shortness of Air., Disp: 8 g, Rfl: 2    brompheniramine-pseudoephedrine-DM 30-2-10 MG/5ML syrup, Take 5 mL by mouth 4 (Four) Times a Day As Needed for Congestion., Disp: 200 mL, Rfl: 0    doxycycline (VIBRAMYCIN) 100 MG capsule, Take 1 capsule by mouth 2 (Two) Times a Day., Disp: 20 capsule, Rfl: 0    predniSONE (DELTASONE) 10 MG (21) dose pack, Use as directed on package, Disp: 21 each, Rfl: 0      Allergies   Allergen Reactions    Latex Rash         PHYSICAL EXAM:    /80   Pulse 73   Ht 177.8 cm (70\")   Wt 80.3 kg (177 lb)   SpO2 97%   BMI 25.40 kg/m²        Wt Readings from Last 5 Encounters:   12/12/24 80.3 kg (177 lb)   05/15/24 83.9 kg (185 lb)   11/21/23 81 kg (178 lb 9.6 oz)   11/10/23 79.1 kg (174 lb 6.4 oz)   10/23/23 79.4 kg (175 lb)       BP Readings from Last 5 Encounters:   12/12/24 142/80   05/15/24 138/72   11/21/23 130/82   11/10/23 126/74   10/12/23 126/80       General appearance - Alert, well appearing, and in no distress   Mental status - Affect appropriate to mood.  Eyes - Sclerae anicteric,  ENMT - Hearing grossly normal bilaterally, Dental hygiene good.  Neck - Carotids upstroke normal bilaterally, no bruits, no JVD.  Resp - Clear to auscultation, no wheezes, rales or rhonchi, symmetric air entry.  Heart - Normal rate, regular rhythm, normal S1, S2, 2/6 systolic ejection murmur  GI - Soft, nontender, nondistended, no masses or organomegaly.  Neurological - Grossly intact - normal speech, no focal findings  Musculoskeletal - No joint tenderness, deformity or swelling, no muscular tenderness noted.  Extremities - Peripheral pulses normal, no pedal edema, no clubbing or cyanosis.  Skin - Normal coloration and turgor.  Psych -  oriented to person, " place, and time.    Medical problems and test results were reviewed with the patient today.           EKG: (EKG has been independently visualized by me and summarized below)    ECG 12 Lead    Date/Time: 12/12/2024 1:42 PM  Performed by: Carrington Marc PA    Authorized by: Carrington Marc PA  Comparison: compared with previous ECG from 11/21/2023  Similar to previous ECG  Rhythm: sinus rhythm  Rate: normal  Conduction: conduction normal  ST Segments: ST segments normal  QRS axis: normal    Clinical impression: normal ECG             ASSESSMENT   1.  Noncardiac chest pain: Negative stress test October 23, 2023 previous echocardiogram in [place] normal LV function no significant valvular heart disease with exception of mild calcification aortic valve..No chest pain suggesting angina.     2.  Carotid duplex scan revealing less than 50% stenosis, continue statin aspirin therapy.    3.  Ongoing tobacco use: Discussed need for immediate cessation dangers not doing so    4.  Hypertension: She states blood pressures been elevated recently 140s to 160s systolic at times.  Will go ahead and increase her losartan to 100 mg daily.  Check blood pressure for 2 weeks recheck BMP in 2 weeks.    PLAN  Increase losartan to 100 mg daily, target blood pressure less than 130 systolic.  She is going to check a BMP in 2 weeks.  Turn for follow-up 6 months returns from [place].    12/12/2024  13:12 EST    Electronically signed by AVELINA Aguayo, 11/21/23, 1:28 PM EST.

## 2025-01-13 ENCOUNTER — TELEPHONE (OUTPATIENT)
Dept: CARDIOLOGY | Facility: CLINIC | Age: 75
End: 2025-01-13
Payer: MEDICARE

## 2025-01-13 NOTE — TELEPHONE ENCOUNTER
Called pt with appt for tomorrow 1/14/25 at 1 pm.  PT was advised to bring in her recent BP and HR readings.    Pt was agreeable and verbalized understanding

## 2025-01-13 NOTE — TELEPHONE ENCOUNTER
Caller: Lanie Ruiz    Relationship to patient: Self    Best call back number: [phone]    Chief complaint: PATIENT REQUESTING TO SEE PROVIDER IN REGARDS TO HER HIGH BLOOD PRESSURE. STATES IT HAS NOT IMPROVED EVEN AFTER HE INCREASED HER LOSARTAN. REPORTS A BP /95 THIS MORNING.     Type of visit: FU    Requested date: ANY DAY THIS WEEK EXCEPT FOR TODAY

## 2025-01-14 ENCOUNTER — LAB (OUTPATIENT)
Dept: LAB | Facility: HOSPITAL | Age: 75
End: 2025-01-14
Payer: MEDICARE

## 2025-01-14 ENCOUNTER — OFFICE VISIT (OUTPATIENT)
Dept: CARDIOLOGY | Facility: CLINIC | Age: 75
End: 2025-01-14
Payer: MEDICARE

## 2025-01-14 VITALS
OXYGEN SATURATION: 97 % | DIASTOLIC BLOOD PRESSURE: 82 MMHG | BODY MASS INDEX: 25.62 KG/M2 | WEIGHT: 179 LBS | HEART RATE: 82 BPM | HEIGHT: 70 IN | SYSTOLIC BLOOD PRESSURE: 142 MMHG

## 2025-01-14 DIAGNOSIS — I10 PRIMARY HYPERTENSION: ICD-10-CM

## 2025-01-14 DIAGNOSIS — E78.2 MIXED HYPERLIPIDEMIA: Primary | ICD-10-CM

## 2025-01-14 DIAGNOSIS — I10 ESSENTIAL HYPERTENSION: ICD-10-CM

## 2025-01-14 LAB
ANION GAP SERPL CALCULATED.3IONS-SCNC: 10 MMOL/L (ref 5–15)
BUN SERPL-MCNC: 8 MG/DL (ref 8–23)
BUN/CREAT SERPL: 10.7 (ref 7–25)
CALCIUM SPEC-SCNC: 9.3 MG/DL (ref 8.6–10.5)
CHLORIDE SERPL-SCNC: 108 MMOL/L (ref 98–107)
CO2 SERPL-SCNC: 25 MMOL/L (ref 22–29)
CREAT SERPL-MCNC: 0.75 MG/DL (ref 0.57–1)
EGFRCR SERPLBLD CKD-EPI 2021: 84.2 ML/MIN/1.73
GLUCOSE SERPL-MCNC: 81 MG/DL (ref 65–99)
POTASSIUM SERPL-SCNC: 3.9 MMOL/L (ref 3.5–5.2)
SODIUM SERPL-SCNC: 143 MMOL/L (ref 136–145)

## 2025-01-14 PROCEDURE — 3079F DIAST BP 80-89 MM HG: CPT | Performed by: PHYSICIAN ASSISTANT

## 2025-01-14 PROCEDURE — 99214 OFFICE O/P EST MOD 30 MIN: CPT | Performed by: PHYSICIAN ASSISTANT

## 2025-01-14 PROCEDURE — 3077F SYST BP >= 140 MM HG: CPT | Performed by: PHYSICIAN ASSISTANT

## 2025-01-14 PROCEDURE — 80048 BASIC METABOLIC PNL TOTAL CA: CPT

## 2025-01-14 PROCEDURE — 36415 COLL VENOUS BLD VENIPUNCTURE: CPT

## 2025-01-14 RX ORDER — LOSARTAN POTASSIUM 100 MG/1
100 TABLET ORAL DAILY
Qty: 90 TABLET | Refills: 3 | Status: CANCELLED | OUTPATIENT
Start: 2025-01-14

## 2025-01-14 RX ORDER — AMLODIPINE BESYLATE 5 MG/1
5 TABLET ORAL 2 TIMES DAILY
Qty: 60 TABLET | Refills: 6 | Status: SHIPPED | OUTPATIENT
Start: 2025-01-14

## 2025-01-14 NOTE — PROGRESS NOTES
[facility] at [facility]   OFFICE NOTE      Lanie Ruiz  1950  PCP: Faisal Mar MD    SUBJECTIVE:   Lanie Ruiz is a 74 y.o. female seen for a follow up visit regarding the following:     CC: Noncardiac chest pain.    HPI:   Pleasant 74-year-old female returns today follow-up regarding recent increase in blood pressure.  She tried higher dose losartan 2 weeks ago.  She still has some blood pressure elevated at 140-150 systolic.  She denies any chest pain chest tightness heart failure symptoms.  She continues to have some dizziness she is think she has a sinus infection she is going to check with her PCP.    Cardiac PMH: (Old records have been reviewed and summarized below)  Noncardiac chest pain  Remote negative Stress MPS  Followed cardiologist in [place]  [place]chocardiogram [place], normal LV function mild calcification aortic valve no significant aortic stenosis.  No other significant valvular heart disease.  Stress test October 23, 2023 no evidence of ischemia normal LV function  HTN  Multiple Medications  Recently restarted Chlorithiadone  Dizziness-resolved.  Carotid duplex scan less than 50% stenosis.  Recommend continue high-dose statin Lipitor therapy, stop smoking  Mild Obesity  HLD  Tobacco abuse, 1/2 ppd   OA   Family History of CAD,Brother MI     Past Medical History, Past Surgical History, Family history, Social History, and Medications were all reviewed with the patient today and updated as necessary.       Current Outpatient Medications:     amLODIPine (NORVASC) 5 MG tablet, Take 1 tablet by mouth 2 (Two) Times a Day., Disp: 60 tablet, Rfl: 6    aspirin (Aspirin Adult Low Dose) 81 MG EC tablet, , Disp: , Rfl:     atorvastatin (LIPITOR) 80 MG tablet, TAKE 1 TABLET BY MOUTH EVERY NIGHT, Disp: 90 tablet, Rfl: 1    Cholecalciferol (Vitamin D-3) 125 MCG (5000 UT) tablet, Vitamin D3, Disp: , Rfl:     Cyanocobalamin (VITAMIN B 12 PO), Take  by mouth., Disp: , Rfl:      "losartan (COZAAR) 100 MG tablet, Take 1 tablet by mouth Daily., Disp: 90 tablet, Rfl: 3    albuterol sulfate  (90 Base) MCG/ACT inhaler, Inhale 2 puffs Every 4 (Four) Hours As Needed for Wheezing or Shortness of Air., Disp: 8 g, Rfl: 2    brompheniramine-pseudoephedrine-DM 30-2-10 MG/5ML syrup, Take 5 mL by mouth 4 (Four) Times a Day As Needed for Congestion., Disp: 200 mL, Rfl: 0    doxycycline (VIBRAMYCIN) 100 MG capsule, Take 1 capsule by mouth 2 (Two) Times a Day., Disp: 20 capsule, Rfl: 0    predniSONE (DELTASONE) 10 MG (21) dose pack, Use as directed on package, Disp: 21 each, Rfl: 0      Allergies   Allergen Reactions    Latex Rash         PHYSICAL EXAM:    /82 (BP Location: Right arm, Patient Position: Sitting)   Pulse 82   Ht 177.8 cm (70\")   Wt 81.2 kg (179 lb)   SpO2 97%   BMI 25.68 kg/m²        Wt Readings from Last 5 Encounters:   01/14/25 81.2 kg (179 lb)   12/12/24 80.3 kg (177 lb)   05/15/24 83.9 kg (185 lb)   11/21/23 81 kg (178 lb 9.6 oz)   11/10/23 79.1 kg (174 lb 6.4 oz)       BP Readings from Last 5 Encounters:   01/14/25 142/82   12/12/24 142/80   05/15/24 138/72   11/21/23 130/82   11/10/23 126/74       General appearance - Alert, well appearing, and in no distress   Mental status - Affect appropriate to mood.  Eyes - Sclerae anicteric,  ENMT - Hearing grossly normal bilaterally, Dental hygiene good.  Neck - Carotids upstroke normal bilaterally, no bruits, no JVD.  Resp - Clear to auscultation, no wheezes, rales or rhonchi, symmetric air entry.  Heart - Normal rate, regular rhythm, normal S1, S2, 2/6 systolic ejection murmur  GI - Soft, nontender, nondistended, no masses or organomegaly.  Neurological - Grossly intact - normal speech, no focal findings  Musculoskeletal - No joint tenderness, deformity or swelling, no muscular tenderness noted.  Extremities - Peripheral pulses normal, no pedal edema, no clubbing or cyanosis.  Skin - Normal coloration and turgor.  Psych -  " oriented to person, place, and time.    Medical problems and test results were reviewed with the patient today.         Procedures       ASSESSMENT   1.  Noncardiac chest pain: Negative stress test October 23, 2023 previous echocardiogram in [place] normal LV function no significant valvular heart disease with exception of mild calcification aortic valve..No chest pain suggesting angina.     2.  Carotid duplex scan revealing less than 50% stenosis, continue statin aspirin therapy.    3.  Ongoing tobacco use: Discussed need for immediate cessation dangers not doing so    4.  Hypertension: Still elevated on higher dose losartan will try Norvasc 5 mg twice daily.    PLAN  Stop smoking, increase amlodipine to 5 mg twice daily continue losartan 100 mg daily.  She is going to get a BMP today.  Return follow-up or office this summer.  With PCP regarding dizziness.    1/14/2025  14:34 EST    Electronically signed by AVELINA Aguayo, 11/21/23, 1:28 PM EST.

## 2025-01-15 ENCOUNTER — OFFICE VISIT (OUTPATIENT)
Dept: FAMILY MEDICINE CLINIC | Facility: CLINIC | Age: 75
End: 2025-01-15
Payer: MEDICARE

## 2025-01-15 VITALS
TEMPERATURE: 97.5 F | RESPIRATION RATE: 14 BRPM | HEIGHT: 70 IN | HEART RATE: 67 BPM | DIASTOLIC BLOOD PRESSURE: 78 MMHG | OXYGEN SATURATION: 99 % | WEIGHT: 178.6 LBS | SYSTOLIC BLOOD PRESSURE: 138 MMHG | BODY MASS INDEX: 25.57 KG/M2

## 2025-01-15 DIAGNOSIS — J01.00 ACUTE NON-RECURRENT MAXILLARY SINUSITIS: Primary | ICD-10-CM

## 2025-01-15 PROCEDURE — 1160F RVW MEDS BY RX/DR IN RCRD: CPT | Performed by: NURSE PRACTITIONER

## 2025-01-15 PROCEDURE — 3078F DIAST BP <80 MM HG: CPT | Performed by: NURSE PRACTITIONER

## 2025-01-15 PROCEDURE — 3075F SYST BP GE 130 - 139MM HG: CPT | Performed by: NURSE PRACTITIONER

## 2025-01-15 PROCEDURE — G2211 COMPLEX E/M VISIT ADD ON: HCPCS | Performed by: NURSE PRACTITIONER

## 2025-01-15 PROCEDURE — 1159F MED LIST DOCD IN RCRD: CPT | Performed by: NURSE PRACTITIONER

## 2025-01-15 PROCEDURE — 99213 OFFICE O/P EST LOW 20 MIN: CPT | Performed by: NURSE PRACTITIONER

## 2025-01-15 PROCEDURE — 1125F AMNT PAIN NOTED PAIN PRSNT: CPT | Performed by: NURSE PRACTITIONER

## 2025-01-15 RX ORDER — METHYLPREDNISOLONE 4 MG/1
TABLET ORAL
Qty: 21 TABLET | Refills: 0 | Status: SHIPPED | OUTPATIENT
Start: 2025-01-15

## 2025-01-15 NOTE — PROGRESS NOTES
Date: 01/15/2025   Patient Name: Lanie Ruiz  : 1950   MRN: [id number]     Chief Complaint:    Chief Complaint   Patient presents with    Illness     Sinus problems, dizzy & headache when she moves her eyes. Duration for a week.       History of Present Illness: Lanie Ruiz is a 74 y.o. female who is here today to follow up for Taking tylneol without relief of symtpoms  No known exposure to covid or flu  Symptoms started about 1 week ago      Nasal congestion, dizzyness, muscle ache right shoulder blade  Symptoms are: new.   Onset was in the past 7 days.   Symptoms occur: daily.  Symptoms include: change in stool, nasal congestion, headaches and myalgias.   Pertinent negative symptoms include no abdominal pain, no anorexia, no joint pain, no chest pain, no chills, no cough, no diaphoresis, no fatigue, no fever, no joint swelling, no nausea, no neck pain, no numbness, no rash, no sore throat, no swollen glands, no dysuria, no vertigo, no visual change, no vomiting and no weakness.   Treatment and/or Medications comments include: Tylenol   Additional information: Perhaps a sinus infection?          Review of Systems:   Review of Systems   Constitutional:  Negative for chills, diaphoresis, fatigue and fever.   HENT:  Positive for congestion. Negative for sore throat and swollen glands.    Respiratory:  Negative for cough.    Cardiovascular:  Negative for chest pain.   Gastrointestinal:  Negative for abdominal pain, anorexia, nausea and vomiting.   Genitourinary:  Negative for dysuria.   Musculoskeletal:  Positive for myalgias. Negative for joint pain and neck pain.   Skin:  Negative for rash.   Neurological:  Negative for vertigo, weakness and numbness.       I have reviewed the patients family history, social history, past medical history, past surgical history and have updated it as appropriate.     Medications:     Current Outpatient Medications:     amLODIPine (NORVASC) 5 MG tablet, Take 1 tablet by  "mouth 2 (Two) Times a Day., Disp: 60 tablet, Rfl: 6    aspirin (Aspirin Adult Low Dose) 81 MG EC tablet, , Disp: , Rfl:     atorvastatin (LIPITOR) 80 MG tablet, TAKE 1 TABLET BY MOUTH EVERY NIGHT, Disp: 90 tablet, Rfl: 1    Cholecalciferol (Vitamin D-3) 125 MCG (5000 UT) tablet, Vitamin D3, Disp: , Rfl:     Cyanocobalamin (VITAMIN B 12 PO), Take  by mouth., Disp: , Rfl:     losartan (COZAAR) 100 MG tablet, Take 1 tablet by mouth Daily., Disp: 90 tablet, Rfl: 3    amoxicillin-clavulanate (AUGMENTIN) 875-125 MG per tablet, Take 1 tablet by mouth 2 (Two) Times a Day for 7 days., Disp: 14 tablet, Rfl: 0    methylPREDNISolone (MEDROL) 4 MG dose pack, Take as directed on package instructions., Disp: 21 tablet, Rfl: 0    Allergies:   Allergies   Allergen Reactions    Latex Rash       PHQ-9 Total Score:      Physical Exam:  Vital Signs:   Vitals:    01/15/25 1059   BP: 138/78   Pulse: 67   Resp: 14   Temp: 97.5 °F (36.4 °C)   SpO2: 99%   Weight: 81 kg (178 lb 9.6 oz)   Height: 177.8 cm (70\")     Body mass index is 25.63 kg/m².           Physical Exam  Vitals and nursing note reviewed.   Constitutional:       Appearance: She is not ill-appearing.   HENT:      Head: Normocephalic and atraumatic.      Right Ear: External ear normal. No middle ear effusion. Tympanic membrane is not erythematous or bulging.      Left Ear: External ear normal.  No middle ear effusion. Tympanic membrane is not erythematous or bulging.      Nose: Nose normal. Nasal tenderness and congestion present.      Right Turbinates: Enlarged and swollen.      Left Turbinates: Not enlarged or swollen.      Right Sinus: Maxillary sinus tenderness present.      Left Sinus: No maxillary sinus tenderness.      Mouth/Throat:      Mouth: Mucous membranes are moist.      Pharynx: No pharyngeal swelling or posterior oropharyngeal erythema.      Tonsils: No tonsillar exudate.   Eyes:      Pupils: Pupils are equal, round, and reactive to light.   Cardiovascular:      " Rate and Rhythm: Normal rate and regular rhythm.   Pulmonary:      Effort: Pulmonary effort is normal.      Breath sounds: Normal breath sounds.   Lymphadenopathy:      Cervical: No cervical adenopathy.   Skin:     General: Skin is warm.   Neurological:      General: No focal deficit present.      Mental Status: She is alert and oriented to person, place, and time.   Psychiatric:         Mood and Affect: Mood normal.           Assessment/Plan:   Diagnoses and all orders for this visit:    1. Acute non-recurrent maxillary sinusitis (Primary)  -     amoxicillin-clavulanate (AUGMENTIN) 875-125 MG per tablet; Take 1 tablet by mouth 2 (Two) Times a Day for 7 days.  Dispense: 14 tablet; Refill: 0  -     methylPREDNISolone (MEDROL) 4 MG dose pack; Take as directed on package instructions.  Dispense: 21 tablet; Refill: 0       No testing indicated  Increase fluid intake  Take medication as prescribed  Monitor for worsening symptoms  Tylenol and ibuprofen as needed for aches and fever.  Go to the ER for any shortness of breath, chest pains, fever uncontrolled by medications.    Follow Up:   Return 2 weeks if symptoms worsen or fail to improve.      Kyra Bang. DINAH   PC [facility]

## 2025-04-29 ENCOUNTER — TELEPHONE (OUTPATIENT)
Dept: CARDIOLOGY | Facility: CLINIC | Age: 75
End: 2025-04-29
Payer: MEDICARE

## 2025-04-29 DIAGNOSIS — Z91.89 CHRONIC CHEST PAIN WITH HIGH RISK FOR CAD: Primary | ICD-10-CM

## 2025-04-29 DIAGNOSIS — G89.29 CHRONIC CHEST PAIN WITH HIGH RISK FOR CAD: Primary | ICD-10-CM

## 2025-04-29 DIAGNOSIS — R07.9 CHRONIC CHEST PAIN WITH HIGH RISK FOR CAD: Primary | ICD-10-CM

## 2025-04-29 NOTE — TELEPHONE ENCOUNTER
Patient saw you in clinic on 1/14/25. She has never seen Dr. Lambert. She would like you to call her to discuss the results of her stress test that was done in March.    Thanks

## 2025-04-29 NOTE — TELEPHONE ENCOUNTER
Caller: Lanie Ruiz    Relationship to patient: Self    Best call back number: [phone]       Type of visit: F/U APPT    Requested date: NEXT AVAILABLE     If rescheduling, when is the original appointment: 6-12-25     Additional notes:PATIENT WOULD LIKE TO DISCUSS THE  RESULTS OF NUCLEAR STRESS SCAN WITH DR SOARES. PLEASE CONTACT PATIENT WITH A SUITABLE DATE.

## 2025-04-29 NOTE — TELEPHONE ENCOUNTER
She had stress test in [place], I tried to call her but her phone line has poor connection cannot get through to her.  She can follow-up with me to discuss results and stress test thank you.

## 2025-05-01 ENCOUNTER — OFFICE VISIT (OUTPATIENT)
Dept: CARDIOLOGY | Facility: CLINIC | Age: 75
End: 2025-05-01
Payer: MEDICARE

## 2025-05-01 VITALS
DIASTOLIC BLOOD PRESSURE: 82 MMHG | HEIGHT: 70 IN | OXYGEN SATURATION: 95 % | HEART RATE: 70 BPM | SYSTOLIC BLOOD PRESSURE: 148 MMHG | BODY MASS INDEX: 25.14 KG/M2 | WEIGHT: 175.6 LBS

## 2025-05-01 DIAGNOSIS — I73.9 PVD (PERIPHERAL VASCULAR DISEASE) WITH CLAUDICATION: ICD-10-CM

## 2025-05-01 DIAGNOSIS — R07.9 CHRONIC CHEST PAIN WITH HIGH RISK FOR CAD: Primary | ICD-10-CM

## 2025-05-01 DIAGNOSIS — G89.29 CHRONIC CHEST PAIN WITH HIGH RISK FOR CAD: Primary | ICD-10-CM

## 2025-05-01 DIAGNOSIS — I10 ESSENTIAL HYPERTENSION: ICD-10-CM

## 2025-05-01 DIAGNOSIS — E78.00 HYPERCHOLESTEREMIA: ICD-10-CM

## 2025-05-01 DIAGNOSIS — Z91.89 CHRONIC CHEST PAIN WITH HIGH RISK FOR CAD: Primary | ICD-10-CM

## 2025-05-01 PROCEDURE — 99214 OFFICE O/P EST MOD 30 MIN: CPT | Performed by: PHYSICIAN ASSISTANT

## 2025-05-01 PROCEDURE — 3079F DIAST BP 80-89 MM HG: CPT | Performed by: PHYSICIAN ASSISTANT

## 2025-05-01 PROCEDURE — 3077F SYST BP >= 140 MM HG: CPT | Performed by: PHYSICIAN ASSISTANT

## 2025-05-01 RX ORDER — ATORVASTATIN CALCIUM 80 MG/1
80 TABLET, FILM COATED ORAL NIGHTLY
Qty: 90 TABLET | Refills: 3 | Status: SHIPPED | OUTPATIENT
Start: 2025-05-01

## 2025-05-01 RX ORDER — NITROGLYCERIN 0.4 MG/1
TABLET SUBLINGUAL
Qty: 100 TABLET | Refills: 11 | Status: SHIPPED | OUTPATIENT
Start: 2025-05-01

## 2025-05-01 RX ORDER — NICOTINE 21 MG/24HR
1 PATCH, TRANSDERMAL 24 HOURS TRANSDERMAL EVERY 24 HOURS
Qty: 10 PATCH | Refills: 1 | Status: SHIPPED | OUTPATIENT
Start: 2025-05-01

## 2025-05-01 RX ORDER — AMLODIPINE BESYLATE 5 MG/1
5 TABLET ORAL DAILY
Qty: 30 TABLET | Refills: 11 | Status: SHIPPED | OUTPATIENT
Start: 2025-05-01

## 2025-05-01 RX ORDER — BISOPROLOL FUMARATE 5 MG/1
2.5 TABLET, FILM COATED ORAL DAILY
Qty: 30 TABLET | Refills: 6 | Status: SHIPPED | OUTPATIENT
Start: 2025-05-01

## 2025-05-01 NOTE — PROGRESS NOTES
[facility] at [facility]   OFFICE NOTE      Lanie Ruiz  1950  PCP: Faisal Mar MD    SUBJECTIVE:   Lanie Ruiz is a 74 y.o. female seen for a follow up visit regarding the following:     CC: Noncardiac chest pain.    HPI:   Pleasant 74-year-old female returns today follow-up regarding continued chest pain hypertension and recently abnormal stress test while visiting in [place].  She tells me a few times when walking she had some pressure in her chest resolved on its own accord.  She actually continue to walk through and the chest pain resolved as she kept walking.  She had a stress test in [place] while visiting there and this was considered abnormal with inferolateral ischemia.  She was called and told that she needed a heart cath.  Prior to this though she left [place] and wanted to be seen in our office for further recommendations.  She currently states she has no chest pain at this time.  Her blood pressures been controlled.  She is trying to quit smoking but has been unsuccessful and still smokes up to a pack a day.  She denies heart failure symptoms such worsening shortness of breath and denies dizziness near syncope or syncope.        Cardiac PMH: (Old records have been reviewed and summarized below)  Chest pain syndrome.   Remote negative Stress MPS  Followed cardiologist in [place]  Echocardiogram [place], normal LV function mild calcification aortic valve no significant aortic stenosis.  No other significant valvular heart disease.  Stress test October 23, 2023 no evidence of ischemia normal LV function  Persistent recurrent chest pain, stress test [place] inferior lateral ischemia normal LV function  HTN  Multiple Medications  Recently restarted Chlorithiadone  Dizziness-resolved.  Carotid duplex scan less than 50% stenosis.  Recommend continue high-dose statin Lipitor therapy, stop smoking  Mild Obesity  HLD  Tobacco abuse, 1/2 ppd   OA   Family History of  "CAD,Brother MI     Past Medical History, Past Surgical History, Family history, Social History, and Medications were all reviewed with the patient today and updated as necessary.       Current Outpatient Medications:     aspirin (Aspirin Adult Low Dose) 81 MG EC tablet, , Disp: , Rfl:     atorvastatin (LIPITOR) 80 MG tablet, Take 1 tablet by mouth Every Night., Disp: 90 tablet, Rfl: 3    Cholecalciferol (Vitamin D-3) 125 MCG (5000 UT) tablet, Vitamin D3, Disp: , Rfl:     Cyanocobalamin (VITAMIN B 12 PO), Take  by mouth., Disp: , Rfl:     losartan (COZAAR) 100 MG tablet, Take 1 tablet by mouth Daily., Disp: 90 tablet, Rfl: 3    amLODIPine (NORVASC) 5 MG tablet, Take 1 tablet by mouth Daily., Disp: 30 tablet, Rfl: 11    bisoprolol (ZEBeta) 5 MG tablet, Take 0.5 tablets by mouth Daily., Disp: 30 tablet, Rfl: 6    nicotine (NICODERM CQ) 14 MG/24HR patch, Place 1 patch on the skin as directed by provider Daily., Disp: 10 patch, Rfl: 1    nitroglycerin (NITROSTAT) 0.4 MG SL tablet, 1 under the tongue as needed for angina, may repeat q5mins for up three doses, Disp: 100 tablet, Rfl: 11      Allergies   Allergen Reactions    Latex Rash         PHYSICAL EXAM:    /82 (BP Location: Right arm, Patient Position: Sitting, Cuff Size: Adult)   Pulse 70   Ht 177.8 cm (70\")   Wt 79.7 kg (175 lb 9.6 oz)   SpO2 95%   BMI 25.20 kg/m²        Wt Readings from Last 5 Encounters:   05/01/25 79.7 kg (175 lb 9.6 oz)   01/15/25 81 kg (178 lb 9.6 oz)   01/14/25 81.2 kg (179 lb)   12/12/24 80.3 kg (177 lb)   05/15/24 83.9 kg (185 lb)       BP Readings from Last 5 Encounters:   05/01/25 148/82   01/15/25 138/78   01/14/25 142/82   12/12/24 142/80   05/15/24 138/72       General appearance - Alert, well appearing, and in no distress   Mental status - Affect appropriate to mood.  Eyes - Sclerae anicteric,  ENMT - Hearing grossly normal bilaterally, Dental hygiene good.  Neck - Carotids upstroke normal bilaterally, no bruits, no " JVD.  Resp -decreased breath sounds no wheezes or rhonchi  Heart - Normal rate, regular rhythm, normal S1, S2, 2/6 systolic ejection murmur  GI - Soft, nontender, nondistended, no masses or organomegaly.  Neurological - Grossly intact - normal speech, no focal findings  Musculoskeletal - No joint tenderness, deformity or swelling, no muscular tenderness noted.  Extremities - Peripheral pulses normal 1-2 Plus, no pedal edema, no clubbing or cyanosis.  Skin - Normal coloration and turgor.  Psych -  oriented to person, place, and time.    Medical problems and test results were reviewed with the patient today.         Procedures       ASSESSMENT   1.  Chest pain previously negative stress test 2023 however persistent chest pain no positive stress test in [place] inferolateral ischemia normal LV function. 3/24/2025:    2.  Carotid duplex scan revealing less than 50% stenosis, continue statin aspirin therapy.    3.  Ongoing tobacco use: Discussed need for immediate cessation dangers not doing so    4.  Hypertension: Intolerance to higher dose amlodipine due to edema.  Will add Zebeta 2.5 mg daily.    5.  Lower extremity neuropathy good lower extremity pulses she would like to pursue ABIs to rule out peripheral vascular disease.    PLAN  Ongoing chest pain recent abnormal stress test.  We had a long discussion today related further treatment options and diagnostic studies.  She would like to pursue a left heart catheterization which is what her full her physician recommended.We discussed the procedure in great detail including risks, benefits, and alternatives. The patient understands that risks include bleeding, infection, stroke, MI, cardiac perforation, and even death.  Add Zebeta 2.5 mg daily for blood pressure and Angina control.  Stop smoking  Medical nitroglycerin as needed  CLAUDIA for PVD     5/1/2025  14:31 EDT    Electronically signed by AVELINA Aguayo, 11/21/23, 1:28 PM EST.

## 2025-05-01 NOTE — H&P (VIEW-ONLY)
[facility] at [facility]   OFFICE NOTE      Lanie Ruiz  1950  PCP: Faisal Mar MD    SUBJECTIVE:   Lanie Ruiz is a 74 y.o. female seen for a follow up visit regarding the following:     CC: Noncardiac chest pain.    HPI:   Pleasant 74-year-old female returns today follow-up regarding continued chest pain hypertension and recently abnormal stress test while visiting in [place].  She tells me a few times when walking she had some pressure in her chest resolved on its own accord.  She actually continue to walk through and the chest pain resolved as she kept walking.  She had a stress test in [place] while visiting there and this was considered abnormal with inferolateral ischemia.  She was called and told that she needed a heart cath.  Prior to this though she left [place] and wanted to be seen in our office for further recommendations.  She currently states she has no chest pain at this time.  Her blood pressures been controlled.  She is trying to quit smoking but has been unsuccessful and still smokes up to a pack a day.  She denies heart failure symptoms such worsening shortness of breath and denies dizziness near syncope or syncope.        Cardiac PMH: (Old records have been reviewed and summarized below)  Chest pain syndrome.   Remote negative Stress MPS  Followed cardiologist in [place]  Echocardiogram [place], normal LV function mild calcification aortic valve no significant aortic stenosis.  No other significant valvular heart disease.  Stress test October 23, 2023 no evidence of ischemia normal LV function  Persistent recurrent chest pain, stress test [place] inferior lateral ischemia normal LV function  HTN  Multiple Medications  Recently restarted Chlorithiadone  Dizziness-resolved.  Carotid duplex scan less than 50% stenosis.  Recommend continue high-dose statin Lipitor therapy, stop smoking  Mild Obesity  HLD  Tobacco abuse, 1/2 ppd   OA   Family History of  "CAD,Brother MI     Past Medical History, Past Surgical History, Family history, Social History, and Medications were all reviewed with the patient today and updated as necessary.       Current Outpatient Medications:     aspirin (Aspirin Adult Low Dose) 81 MG EC tablet, , Disp: , Rfl:     atorvastatin (LIPITOR) 80 MG tablet, Take 1 tablet by mouth Every Night., Disp: 90 tablet, Rfl: 3    Cholecalciferol (Vitamin D-3) 125 MCG (5000 UT) tablet, Vitamin D3, Disp: , Rfl:     Cyanocobalamin (VITAMIN B 12 PO), Take  by mouth., Disp: , Rfl:     losartan (COZAAR) 100 MG tablet, Take 1 tablet by mouth Daily., Disp: 90 tablet, Rfl: 3    amLODIPine (NORVASC) 5 MG tablet, Take 1 tablet by mouth Daily., Disp: 30 tablet, Rfl: 11    bisoprolol (ZEBeta) 5 MG tablet, Take 0.5 tablets by mouth Daily., Disp: 30 tablet, Rfl: 6    nicotine (NICODERM CQ) 14 MG/24HR patch, Place 1 patch on the skin as directed by provider Daily., Disp: 10 patch, Rfl: 1    nitroglycerin (NITROSTAT) 0.4 MG SL tablet, 1 under the tongue as needed for angina, may repeat q5mins for up three doses, Disp: 100 tablet, Rfl: 11      Allergies   Allergen Reactions    Latex Rash         PHYSICAL EXAM:    /82 (BP Location: Right arm, Patient Position: Sitting, Cuff Size: Adult)   Pulse 70   Ht 177.8 cm (70\")   Wt 79.7 kg (175 lb 9.6 oz)   SpO2 95%   BMI 25.20 kg/m²        Wt Readings from Last 5 Encounters:   05/01/25 79.7 kg (175 lb 9.6 oz)   01/15/25 81 kg (178 lb 9.6 oz)   01/14/25 81.2 kg (179 lb)   12/12/24 80.3 kg (177 lb)   05/15/24 83.9 kg (185 lb)       BP Readings from Last 5 Encounters:   05/01/25 148/82   01/15/25 138/78   01/14/25 142/82   12/12/24 142/80   05/15/24 138/72       General appearance - Alert, well appearing, and in no distress   Mental status - Affect appropriate to mood.  Eyes - Sclerae anicteric,  ENMT - Hearing grossly normal bilaterally, Dental hygiene good.  Neck - Carotids upstroke normal bilaterally, no bruits, no " JVD.  Resp -decreased breath sounds no wheezes or rhonchi  Heart - Normal rate, regular rhythm, normal S1, S2, 2/6 systolic ejection murmur  GI - Soft, nontender, nondistended, no masses or organomegaly.  Neurological - Grossly intact - normal speech, no focal findings  Musculoskeletal - No joint tenderness, deformity or swelling, no muscular tenderness noted.  Extremities - Peripheral pulses normal 1-2 Plus, no pedal edema, no clubbing or cyanosis.  Skin - Normal coloration and turgor.  Psych -  oriented to person, place, and time.    Medical problems and test results were reviewed with the patient today.         Procedures       ASSESSMENT   1.  Chest pain previously negative stress test 2023 however persistent chest pain no positive stress test in [place] inferolateral ischemia normal LV function. 3/24/2025:    2.  Carotid duplex scan revealing less than 50% stenosis, continue statin aspirin therapy.    3.  Ongoing tobacco use: Discussed need for immediate cessation dangers not doing so    4.  Hypertension: Intolerance to higher dose amlodipine due to edema.  Will add Zebeta 2.5 mg daily.    5.  Lower extremity neuropathy good lower extremity pulses she would like to pursue ABIs to rule out peripheral vascular disease.    PLAN  Ongoing chest pain recent abnormal stress test.  We had a long discussion today related further treatment options and diagnostic studies.  She would like to pursue a left heart catheterization which is what her full her physician recommended.We discussed the procedure in great detail including risks, benefits, and alternatives. The patient understands that risks include bleeding, infection, stroke, MI, cardiac perforation, and even death.  Add Zebeta 2.5 mg daily for blood pressure and Angina control.  Stop smoking  Medical nitroglycerin as needed  CLAUDIA for PVD     5/1/2025  14:31 EDT    Electronically signed by AVELINA gAuayo, 11/21/23, 1:28 PM EST.

## 2025-05-05 ENCOUNTER — LAB (OUTPATIENT)
Facility: HOSPITAL | Age: 75
End: 2025-05-05
Payer: MEDICARE

## 2025-05-05 ENCOUNTER — HOSPITAL ENCOUNTER (OUTPATIENT)
Facility: HOSPITAL | Age: 75
Discharge: HOME | End: 2025-05-05
Payer: MEDICARE

## 2025-05-05 VITALS — BODY MASS INDEX: 25.05 KG/M2 | HEIGHT: 70 IN | WEIGHT: 175 LBS

## 2025-05-05 DIAGNOSIS — I73.9 PVD (PERIPHERAL VASCULAR DISEASE) WITH CLAUDICATION: ICD-10-CM

## 2025-05-05 DIAGNOSIS — I10 ESSENTIAL HYPERTENSION: ICD-10-CM

## 2025-05-05 DIAGNOSIS — Z91.89 CHRONIC CHEST PAIN WITH HIGH RISK FOR CAD: ICD-10-CM

## 2025-05-05 DIAGNOSIS — R07.9 CHRONIC CHEST PAIN WITH HIGH RISK FOR CAD: ICD-10-CM

## 2025-05-05 DIAGNOSIS — E78.2 MIXED HYPERLIPIDEMIA: Primary | ICD-10-CM

## 2025-05-05 DIAGNOSIS — E78.2 MIXED HYPERLIPIDEMIA: ICD-10-CM

## 2025-05-05 DIAGNOSIS — G89.29 CHRONIC CHEST PAIN WITH HIGH RISK FOR CAD: ICD-10-CM

## 2025-05-05 DIAGNOSIS — Z13.1 DIABETES MELLITUS SCREENING: ICD-10-CM

## 2025-05-05 LAB
BASOPHILS # BLD AUTO: 0.06 10*3/MM3 (ref 0–0.2)
BASOPHILS NFR BLD AUTO: 0.8 % (ref 0–1.5)
DEPRECATED RDW RBC AUTO: 48 FL (ref 37–54)
EOSINOPHIL # BLD AUTO: 0.1 10*3/MM3 (ref 0–0.4)
EOSINOPHIL NFR BLD AUTO: 1.3 % (ref 0.3–6.2)
ERYTHROCYTE [DISTWIDTH] IN BLOOD BY AUTOMATED COUNT: 14.5 % (ref 12.3–15.4)
HBA1C MFR BLD: 5.5 % (ref 4.8–5.6)
HCT VFR BLD AUTO: 44.4 % (ref 34–46.6)
HGB BLD-MCNC: 14.5 G/DL (ref 12–15.9)
IMM GRANULOCYTES # BLD AUTO: 0.02 10*3/MM3 (ref 0–0.05)
IMM GRANULOCYTES NFR BLD AUTO: 0.3 % (ref 0–0.5)
LYMPHOCYTES # BLD AUTO: 2.58 10*3/MM3 (ref 0.7–3.1)
LYMPHOCYTES NFR BLD AUTO: 33.1 % (ref 19.6–45.3)
MCH RBC QN AUTO: 29.8 PG (ref 26.6–33)
MCHC RBC AUTO-ENTMCNC: 32.7 G/DL (ref 31.5–35.7)
MCV RBC AUTO: 91.4 FL (ref 79–97)
MONOCYTES # BLD AUTO: 0.81 10*3/MM3 (ref 0.1–0.9)
MONOCYTES NFR BLD AUTO: 10.4 % (ref 5–12)
NEUTROPHILS NFR BLD AUTO: 4.22 10*3/MM3 (ref 1.7–7)
NEUTROPHILS NFR BLD AUTO: 54.1 % (ref 42.7–76)
NRBC BLD AUTO-RTO: 0 /100 WBC (ref 0–0.2)
PLATELET # BLD AUTO: 245 10*3/MM3 (ref 140–450)
PMV BLD AUTO: 11.5 FL (ref 6–12)
RBC # BLD AUTO: 4.86 10*6/MM3 (ref 3.77–5.28)
WBC NRBC COR # BLD AUTO: 7.79 10*3/MM3 (ref 3.4–10.8)

## 2025-05-05 PROCEDURE — 85025 COMPLETE CBC W/AUTO DIFF WBC: CPT

## 2025-05-05 PROCEDURE — 36415 COLL VENOUS BLD VENIPUNCTURE: CPT

## 2025-05-05 PROCEDURE — 93923 UPR/LXTR ART STDY 3+ LVLS: CPT

## 2025-05-05 PROCEDURE — 80061 LIPID PANEL: CPT

## 2025-05-05 PROCEDURE — 93923 UPR/LXTR ART STDY 3+ LVLS: CPT | Performed by: INTERNAL MEDICINE

## 2025-05-05 PROCEDURE — 80053 COMPREHEN METABOLIC PANEL: CPT

## 2025-05-05 PROCEDURE — 83036 HEMOGLOBIN GLYCOSYLATED A1C: CPT

## 2025-05-06 ENCOUNTER — PREP FOR SURGERY (OUTPATIENT)
Dept: OTHER | Facility: HOSPITAL | Age: 75
End: 2025-05-06
Payer: MEDICARE

## 2025-05-06 LAB
ALBUMIN SERPL-MCNC: 4.6 G/DL (ref 3.5–5.2)
ALBUMIN/GLOB SERPL: 1.7 G/DL
ALP SERPL-CCNC: 106 U/L (ref 39–117)
ALT SERPL W P-5'-P-CCNC: 22 U/L (ref 1–33)
ANION GAP SERPL CALCULATED.3IONS-SCNC: 12 MMOL/L (ref 5–15)
AST SERPL-CCNC: 33 U/L (ref 1–32)
BILIRUB SERPL-MCNC: 0.4 MG/DL (ref 0–1.2)
BUN SERPL-MCNC: 12 MG/DL (ref 8–23)
BUN/CREAT SERPL: 16.7 (ref 7–25)
CALCIUM SPEC-SCNC: 10 MG/DL (ref 8.6–10.5)
CHLORIDE SERPL-SCNC: 104 MMOL/L (ref 98–107)
CHOLEST SERPL-MCNC: 181 MG/DL (ref 0–200)
CO2 SERPL-SCNC: 26 MMOL/L (ref 22–29)
CREAT SERPL-MCNC: 0.72 MG/DL (ref 0.57–1)
EGFRCR SERPLBLD CKD-EPI 2021: 88.4 ML/MIN/1.73
GLOBULIN UR ELPH-MCNC: 2.7 GM/DL
GLUCOSE SERPL-MCNC: 81 MG/DL (ref 65–99)
HDLC SERPL-MCNC: 72 MG/DL (ref 40–60)
LDLC SERPL CALC-MCNC: 88 MG/DL (ref 0–100)
LDLC/HDLC SERPL: 1.18 {RATIO}
POTASSIUM SERPL-SCNC: 4.2 MMOL/L (ref 3.5–5.2)
PROT SERPL-MCNC: 7.3 G/DL (ref 6–8.5)
SODIUM SERPL-SCNC: 142 MMOL/L (ref 136–145)
TRIGL SERPL-MCNC: 120 MG/DL (ref 0–150)
VLDLC SERPL-MCNC: 21 MG/DL (ref 5–40)

## 2025-05-06 RX ORDER — ASPIRIN 81 MG/1
81 TABLET ORAL DAILY
Status: CANCELLED | OUTPATIENT
Start: 2025-05-07

## 2025-05-06 RX ORDER — SODIUM CHLORIDE 0.9 % (FLUSH) 0.9 %
10 SYRINGE (ML) INJECTION AS NEEDED
Status: CANCELLED | OUTPATIENT
Start: 2025-05-06

## 2025-05-06 RX ORDER — ACETAMINOPHEN 325 MG/1
650 TABLET ORAL EVERY 4 HOURS PRN
Status: CANCELLED | OUTPATIENT
Start: 2025-05-06

## 2025-05-06 RX ORDER — NITROGLYCERIN 0.4 MG/1
0.4 TABLET SUBLINGUAL
Status: CANCELLED | OUTPATIENT
Start: 2025-05-06

## 2025-05-06 RX ORDER — ASPIRIN 81 MG/1
324 TABLET, CHEWABLE ORAL ONCE
Status: CANCELLED | OUTPATIENT
Start: 2025-05-06 | End: 2025-05-06

## 2025-05-06 RX ORDER — SODIUM CHLORIDE 9 MG/ML
40 INJECTION, SOLUTION INTRAVENOUS AS NEEDED
Status: CANCELLED | OUTPATIENT
Start: 2025-05-06

## 2025-05-06 RX ORDER — SODIUM CHLORIDE 0.9 % (FLUSH) 0.9 %
10 SYRINGE (ML) INJECTION EVERY 12 HOURS SCHEDULED
Status: CANCELLED | OUTPATIENT
Start: 2025-05-06

## 2025-05-07 ENCOUNTER — APPOINTMENT (OUTPATIENT)
Dept: CARDIOLOGY | Facility: HOSPITAL | Age: 75
End: 2025-05-07
Payer: MEDICARE

## 2025-05-07 ENCOUNTER — HOSPITAL ENCOUNTER (OUTPATIENT)
Facility: HOSPITAL | Age: 75
Discharge: HOME | End: 2025-05-07
Attending: INTERNAL MEDICINE | Admitting: INTERNAL MEDICINE
Payer: MEDICARE

## 2025-05-07 VITALS
TEMPERATURE: 97.6 F | HEART RATE: 55 BPM | OXYGEN SATURATION: 95 % | RESPIRATION RATE: 18 BRPM | DIASTOLIC BLOOD PRESSURE: 74 MMHG | SYSTOLIC BLOOD PRESSURE: 144 MMHG

## 2025-05-07 DIAGNOSIS — G89.29 CHRONIC CHEST PAIN WITH HIGH RISK FOR CAD: ICD-10-CM

## 2025-05-07 DIAGNOSIS — R07.9 CHRONIC CHEST PAIN WITH HIGH RISK FOR CAD: ICD-10-CM

## 2025-05-07 DIAGNOSIS — Z91.89 CHRONIC CHEST PAIN WITH HIGH RISK FOR CAD: ICD-10-CM

## 2025-05-07 PROBLEM — I25.118 CORONARY ARTERY DISEASE OF NATIVE ARTERY OF NATIVE HEART WITH STABLE ANGINA PECTORIS: Status: ACTIVE | Noted: 2025-05-07

## 2025-05-07 LAB
AORTIC DIMENSIONLESS INDEX: 0.68 (DI)
AV MEAN PRESS GRAD SYS DOP V1V2: 4.5 MMHG
AV VMAX SYS DOP: 143.5 CM/SEC
BH CV ECHO LEFT VENTRICLE GLOBAL LONGITUDINAL STRAIN: -17.8 %
BH CV ECHO MEAS - AO MAX PG: 8.2 MMHG
BH CV ECHO MEAS - AO ROOT AREA (BSA CORRECTED): 1.7 CM2
BH CV ECHO MEAS - AO ROOT DIAM: 3.3 CM
BH CV ECHO MEAS - AO V2 VTI: 36.1 CM
BH CV ECHO MEAS - AVA(I,D): 2.36 CM2
BH CV ECHO MEAS - EDV(CUBED): 64 ML
BH CV ECHO MEAS - EDV(MOD-SP2): 97.4 ML
BH CV ECHO MEAS - EDV(MOD-SP4): 83.3 ML
BH CV ECHO MEAS - EF(MOD-SP2): 61.9 %
BH CV ECHO MEAS - EF(MOD-SP4): 60.5 %
BH CV ECHO MEAS - ESV(CUBED): 22 ML
BH CV ECHO MEAS - ESV(MOD-SP2): 37.1 ML
BH CV ECHO MEAS - ESV(MOD-SP4): 32.9 ML
BH CV ECHO MEAS - FS: 30 %
BH CV ECHO MEAS - IVS/LVPW: 1 CM
BH CV ECHO MEAS - IVSD: 1.1 CM
BH CV ECHO MEAS - LA DIMENSION: 3.7 CM
BH CV ECHO MEAS - LAT PEAK E' VEL: 12.7 CM/SEC
BH CV ECHO MEAS - LV DIASTOLIC VOL/BSA (35-75): 42.2 CM2
BH CV ECHO MEAS - LV MASS(C)D: 145.6 GRAMS
BH CV ECHO MEAS - LV MAX PG: 3.9 MMHG
BH CV ECHO MEAS - LV MEAN PG: 2 MMHG
BH CV ECHO MEAS - LV SYSTOLIC VOL/BSA (12-30): 16.7 CM2
BH CV ECHO MEAS - LV V1 MAX: 99.3 CM/SEC
BH CV ECHO MEAS - LV V1 VTI: 24.6 CM
BH CV ECHO MEAS - LVIDD: 4 CM
BH CV ECHO MEAS - LVIDS: 2.8 CM
BH CV ECHO MEAS - LVOT AREA: 3.5 CM2
BH CV ECHO MEAS - LVOT DIAM: 2.1 CM
BH CV ECHO MEAS - LVPWD: 1.1 CM
BH CV ECHO MEAS - MED PEAK E' VEL: 10.2 CM/SEC
BH CV ECHO MEAS - MV A MAX VEL: 97.5 CM/SEC
BH CV ECHO MEAS - MV DEC TIME: 0.2 SEC
BH CV ECHO MEAS - MV E MAX VEL: 78.1 CM/SEC
BH CV ECHO MEAS - MV E/A: 0.8
BH CV ECHO MEAS - RAP SYSTOLE: 3 MMHG
BH CV ECHO MEAS - RVSP: 23 MMHG
BH CV ECHO MEAS - SV(LVOT): 85 ML
BH CV ECHO MEAS - SV(MOD-SP2): 60.3 ML
BH CV ECHO MEAS - SV(MOD-SP4): 50.4 ML
BH CV ECHO MEAS - SVI(LVOT): 43.1 ML/M2
BH CV ECHO MEAS - SVI(MOD-SP2): 30.6 ML/M2
BH CV ECHO MEAS - SVI(MOD-SP4): 25.6 ML/M2
BH CV ECHO MEAS - TAPSE (>1.6): 2.7 CM
BH CV ECHO MEAS - TR MAX PG: 19.6 MMHG
BH CV ECHO MEAS - TR MAX VEL: 220.6 CM/SEC
BH CV ECHO MEASUREMENTS AVERAGE E/E' RATIO: 6.82
BH CV LOWER ARTERIAL LEFT ABI RATIO: 1.1
BH CV LOWER ARTERIAL LEFT CALF RATIO: 1.08
BH CV LOWER ARTERIAL LEFT DORSALIS PEDIS SYS MAX: 133
BH CV LOWER ARTERIAL LEFT GREAT TOE SYS MAX: 108
BH CV LOWER ARTERIAL LEFT LOW THIGH RATIO: 1.19
BH CV LOWER ARTERIAL LEFT LOW THIGH SYS MAX: 147
BH CV LOWER ARTERIAL LEFT POPLITEAL SYS MAX: 134
BH CV LOWER ARTERIAL LEFT POST TIBIAL SYS MAX: 137
BH CV LOWER ARTERIAL LEFT TBI RATIO: 0.87
BH CV LOWER ARTERIAL RIGHT ABI RATIO: 1.15
BH CV LOWER ARTERIAL RIGHT CALF RATIO: 1.13
BH CV LOWER ARTERIAL RIGHT DORSALIS PEDIS SYS MAX: 142
BH CV LOWER ARTERIAL RIGHT GREAT TOE SYS MAX: 112
BH CV LOWER ARTERIAL RIGHT LOW THIGH RATIO: 1.19
BH CV LOWER ARTERIAL RIGHT LOW THIGH SYS MAX: 147
BH CV LOWER ARTERIAL RIGHT POPLITEAL SYS MAX: 140
BH CV LOWER ARTERIAL RIGHT POST TIBIAL SYS MAX: 140
BH CV LOWER ARTERIAL RIGHT TBI RATIO: 0.9
BH CV XLRA - RV BASE: 3.9 CM
BH CV XLRA - RV LENGTH: 6.3 CM
BH CV XLRA - RV MID: 3.4 CM
BH CV XLRA - TDI S': 11.4 CM/SEC
LV EF BIPLANE MOD: 60.4 %
UPPER ARTERIAL LEFT ARM BRACHIAL SYS MAX: 124
UPPER ARTERIAL RIGHT ARM BRACHIAL SYS MAX: 121

## 2025-05-07 PROCEDURE — 25010000002 LIDOCAINE PF 1% 1 % SOLUTION: Performed by: INTERNAL MEDICINE

## 2025-05-07 PROCEDURE — 93356 MYOCRD STRAIN IMG SPCKL TRCK: CPT | Performed by: INTERNAL MEDICINE

## 2025-05-07 PROCEDURE — 93458 L HRT ARTERY/VENTRICLE ANGIO: CPT | Performed by: INTERNAL MEDICINE

## 2025-05-07 PROCEDURE — 25810000003 SODIUM CHLORIDE 0.9 % SOLUTION: Performed by: INTERNAL MEDICINE

## 2025-05-07 PROCEDURE — 25010000002 FENTANYL CITRATE (PF) 50 MCG/ML SOLUTION: Performed by: INTERNAL MEDICINE

## 2025-05-07 PROCEDURE — 63710000001 ASPIRIN 81 MG CHEWABLE TABLET: Performed by: PHYSICIAN ASSISTANT

## 2025-05-07 PROCEDURE — 25510000001 IOPAMIDOL PER 1 ML: Performed by: INTERNAL MEDICINE

## 2025-05-07 PROCEDURE — C1769 GUIDE WIRE: HCPCS | Performed by: INTERNAL MEDICINE

## 2025-05-07 PROCEDURE — 25010000002 HEPARIN (PORCINE) PER 1000 UNITS: Performed by: INTERNAL MEDICINE

## 2025-05-07 PROCEDURE — 25810000003 SODIUM CHLORIDE 0.9 % SOLUTION: Performed by: PHYSICIAN ASSISTANT

## 2025-05-07 PROCEDURE — 25010000002 MIDAZOLAM PER 1 MG: Performed by: INTERNAL MEDICINE

## 2025-05-07 PROCEDURE — 93356 MYOCRD STRAIN IMG SPCKL TRCK: CPT

## 2025-05-07 PROCEDURE — A9270 NON-COVERED ITEM OR SERVICE: HCPCS | Performed by: PHYSICIAN ASSISTANT

## 2025-05-07 PROCEDURE — 25010000002 NICARDIPINE 2.5 MG/ML SOLUTION: Performed by: INTERNAL MEDICINE

## 2025-05-07 PROCEDURE — 93306 TTE W/DOPPLER COMPLETE: CPT

## 2025-05-07 PROCEDURE — C1894 INTRO/SHEATH, NON-LASER: HCPCS | Performed by: INTERNAL MEDICINE

## 2025-05-07 PROCEDURE — 93306 TTE W/DOPPLER COMPLETE: CPT | Performed by: INTERNAL MEDICINE

## 2025-05-07 RX ORDER — IOPAMIDOL 755 MG/ML
INJECTION, SOLUTION INTRAVASCULAR
Status: DISCONTINUED | OUTPATIENT
Start: 2025-05-07 | End: 2025-05-07 | Stop reason: HOSPADM

## 2025-05-07 RX ORDER — ACETAMINOPHEN 325 MG/1
650 TABLET ORAL EVERY 4 HOURS PRN
Status: DISCONTINUED | OUTPATIENT
Start: 2025-05-07 | End: 2025-05-07 | Stop reason: HOSPADM

## 2025-05-07 RX ORDER — ASPIRIN 81 MG/1
324 TABLET, CHEWABLE ORAL ONCE
Status: COMPLETED | OUTPATIENT
Start: 2025-05-07 | End: 2025-05-07

## 2025-05-07 RX ORDER — HEPARIN SODIUM 1000 [USP'U]/ML
INJECTION, SOLUTION INTRAVENOUS; SUBCUTANEOUS
Status: DISCONTINUED | OUTPATIENT
Start: 2025-05-07 | End: 2025-05-07 | Stop reason: HOSPADM

## 2025-05-07 RX ORDER — FENTANYL CITRATE 50 UG/ML
INJECTION, SOLUTION INTRAMUSCULAR; INTRAVENOUS
Status: DISCONTINUED | OUTPATIENT
Start: 2025-05-07 | End: 2025-05-07 | Stop reason: HOSPADM

## 2025-05-07 RX ORDER — LIDOCAINE HYDROCHLORIDE 10 MG/ML
INJECTION, SOLUTION EPIDURAL; INFILTRATION; INTRACAUDAL; PERINEURAL
Status: DISCONTINUED | OUTPATIENT
Start: 2025-05-07 | End: 2025-05-07 | Stop reason: HOSPADM

## 2025-05-07 RX ORDER — SODIUM CHLORIDE 9 MG/ML
1 INJECTION, SOLUTION INTRAVENOUS CONTINUOUS
Status: DISCONTINUED | OUTPATIENT
Start: 2025-05-07 | End: 2025-05-07 | Stop reason: HOSPADM

## 2025-05-07 RX ORDER — SODIUM CHLORIDE 0.9 % (FLUSH) 0.9 %
10 SYRINGE (ML) INJECTION AS NEEDED
Status: DISCONTINUED | OUTPATIENT
Start: 2025-05-07 | End: 2025-05-07 | Stop reason: HOSPADM

## 2025-05-07 RX ORDER — SODIUM CHLORIDE 0.9 % (FLUSH) 0.9 %
10 SYRINGE (ML) INJECTION EVERY 12 HOURS SCHEDULED
Status: DISCONTINUED | OUTPATIENT
Start: 2025-05-07 | End: 2025-05-07 | Stop reason: HOSPADM

## 2025-05-07 RX ORDER — ASPIRIN 81 MG/1
81 TABLET ORAL DAILY
Status: DISCONTINUED | OUTPATIENT
Start: 2025-05-08 | End: 2025-05-07 | Stop reason: HOSPADM

## 2025-05-07 RX ORDER — MIDAZOLAM HYDROCHLORIDE 1 MG/ML
INJECTION, SOLUTION INTRAMUSCULAR; INTRAVENOUS
Status: DISCONTINUED | OUTPATIENT
Start: 2025-05-07 | End: 2025-05-07 | Stop reason: HOSPADM

## 2025-05-07 RX ORDER — NITROGLYCERIN 0.4 MG/1
0.4 TABLET SUBLINGUAL
Status: DISCONTINUED | OUTPATIENT
Start: 2025-05-07 | End: 2025-05-07 | Stop reason: HOSPADM

## 2025-05-07 RX ORDER — NITROGLYCERIN 0.4 MG/1
0.4 TABLET SUBLINGUAL
Status: DISCONTINUED | OUTPATIENT
Start: 2025-05-07 | End: 2025-05-07 | Stop reason: SDUPTHER

## 2025-05-07 RX ORDER — SODIUM CHLORIDE 9 MG/ML
40 INJECTION, SOLUTION INTRAVENOUS AS NEEDED
Status: DISCONTINUED | OUTPATIENT
Start: 2025-05-07 | End: 2025-05-07 | Stop reason: HOSPADM

## 2025-05-07 RX ADMIN — SODIUM CHLORIDE 238.2 ML: 9 INJECTION, SOLUTION INTRAVENOUS at 07:04

## 2025-05-07 RX ADMIN — ASPIRIN 324 MG: 81 TABLET, CHEWABLE ORAL at 07:04

## 2025-05-07 NOTE — Clinical Note
Hemostasis started on the right radial artery. Radial compression device applied to vessel. Hemostasis achieved successfully. Closure device additional comment: 8ml air in tr band

## 2025-05-07 NOTE — INTERVAL H&P NOTE
H&P reviewed.  The patient was examined and there are no changes to the H&P.    The risks, benefits, and alternative options of cardiac catheterization were discussed with the patient.  The risks include death, MI, stroke, infection, vascular injury requiring surgical repair and/or blood transfusion, coronary dissection, renal dysfunction/failure, allergic reaction, emergent CABG.  If PCI is needed there is a 1-2% risk of emergent CABG.  The patient is agreeable for cardiac catheterization, possible PCI or CABG. Plan is to proceed with cardiac catheterization and possible PCI.     Zacarias Sánchez MD, FACC, [facility]  Interventional Cardiology  05/07/25  07:31 EDT

## 2025-05-07 NOTE — Clinical Note
The left DP pulse is +2. The right DP pulse is +2. The left radial pulse is +2. The right radial pulse is +2.

## 2025-05-25 ENCOUNTER — APPOINTMENT (OUTPATIENT)
Facility: HOSPITAL | Age: 75
End: 2025-05-25
Payer: MEDICARE

## 2025-05-25 ENCOUNTER — ANCILLARY PROCEDURE (OUTPATIENT)
Facility: HOSPITAL | Age: 75
End: 2025-05-25
Payer: MEDICARE

## 2025-05-25 ENCOUNTER — HOSPITAL ENCOUNTER (EMERGENCY)
Facility: HOSPITAL | Age: 75
Discharge: HOME | End: 2025-05-25
Attending: EMERGENCY MEDICINE | Admitting: EMERGENCY MEDICINE
Payer: MEDICARE

## 2025-05-25 VITALS
OXYGEN SATURATION: 97 % | SYSTOLIC BLOOD PRESSURE: 134 MMHG | HEIGHT: 69 IN | WEIGHT: 175 LBS | TEMPERATURE: 98.2 F | RESPIRATION RATE: 18 BRPM | DIASTOLIC BLOOD PRESSURE: 77 MMHG | HEART RATE: 55 BPM | BODY MASS INDEX: 25.92 KG/M2

## 2025-05-25 DIAGNOSIS — S86.911A KNEE STRAIN, RIGHT, INITIAL ENCOUNTER: Primary | ICD-10-CM

## 2025-05-25 LAB
ALBUMIN SERPL-MCNC: 4.4 G/DL (ref 3.5–5.2)
ALBUMIN/GLOB SERPL: 1.9 G/DL
ALP SERPL-CCNC: 102 U/L (ref 39–117)
ALT SERPL W P-5'-P-CCNC: 24 U/L (ref 1–33)
ANION GAP SERPL CALCULATED.3IONS-SCNC: 9.9 MMOL/L (ref 5–15)
AST SERPL-CCNC: 32 U/L (ref 1–32)
BASOPHILS # BLD AUTO: 0.04 10*3/MM3 (ref 0–0.2)
BASOPHILS NFR BLD AUTO: 0.6 % (ref 0–1.5)
BILIRUB SERPL-MCNC: 0.4 MG/DL (ref 0–1.2)
BUN SERPL-MCNC: 13 MG/DL (ref 8–23)
BUN/CREAT SERPL: 18.6 (ref 7–25)
CALCIUM SPEC-SCNC: 9.1 MG/DL (ref 8.6–10.5)
CHLORIDE SERPL-SCNC: 109 MMOL/L (ref 98–107)
CO2 SERPL-SCNC: 26.1 MMOL/L (ref 22–29)
CREAT SERPL-MCNC: 0.7 MG/DL (ref 0.57–1)
CRP SERPL-MCNC: <0.3 MG/DL (ref 0–0.5)
DEPRECATED RDW RBC AUTO: 56.6 FL (ref 37–54)
EGFRCR SERPLBLD CKD-EPI 2021: 91.5 ML/MIN/1.73
EOSINOPHIL # BLD AUTO: 0.13 10*3/MM3 (ref 0–0.4)
EOSINOPHIL NFR BLD AUTO: 2 % (ref 0.3–6.2)
ERYTHROCYTE [DISTWIDTH] IN BLOOD BY AUTOMATED COUNT: 16.2 % (ref 12.3–15.4)
ERYTHROCYTE [SEDIMENTATION RATE] IN BLOOD: 2 MM/HR (ref 0–30)
GLOBULIN UR ELPH-MCNC: 2.3 GM/DL
GLUCOSE SERPL-MCNC: 84 MG/DL (ref 65–99)
HCT VFR BLD AUTO: 40 % (ref 34–46.6)
HGB BLD-MCNC: 13 G/DL (ref 12–15.9)
IMM GRANULOCYTES # BLD AUTO: 0.01 10*3/MM3 (ref 0–0.05)
IMM GRANULOCYTES NFR BLD AUTO: 0.2 % (ref 0–0.5)
LYMPHOCYTES # BLD AUTO: 1.93 10*3/MM3 (ref 0.7–3.1)
LYMPHOCYTES NFR BLD AUTO: 29.3 % (ref 19.6–45.3)
MCH RBC QN AUTO: 30.5 PG (ref 26.6–33)
MCHC RBC AUTO-ENTMCNC: 32.5 G/DL (ref 31.5–35.7)
MCV RBC AUTO: 93.9 FL (ref 79–97)
MONOCYTES # BLD AUTO: 0.56 10*3/MM3 (ref 0.1–0.9)
MONOCYTES NFR BLD AUTO: 8.5 % (ref 5–12)
NEUTROPHILS NFR BLD AUTO: 3.92 10*3/MM3 (ref 1.7–7)
NEUTROPHILS NFR BLD AUTO: 59.4 % (ref 42.7–76)
PLATELET # BLD AUTO: 255 10*3/MM3 (ref 140–450)
PMV BLD AUTO: 11.5 FL (ref 6–12)
POTASSIUM SERPL-SCNC: 4 MMOL/L (ref 3.5–5.2)
PROT SERPL-MCNC: 6.7 G/DL (ref 6–8.5)
RBC # BLD AUTO: 4.26 10*6/MM3 (ref 3.77–5.28)
SODIUM SERPL-SCNC: 145 MMOL/L (ref 136–145)
URATE SERPL-MCNC: 4 MG/DL (ref 2.4–5.7)
WBC NRBC COR # BLD AUTO: 6.59 10*3/MM3 (ref 3.4–10.8)

## 2025-05-25 PROCEDURE — 96374 THER/PROPH/DIAG INJ IV PUSH: CPT

## 2025-05-25 PROCEDURE — 25010000002 KETOROLAC TROMETHAMINE PER 15 MG: Performed by: EMERGENCY MEDICINE

## 2025-05-25 PROCEDURE — 80053 COMPREHEN METABOLIC PANEL: CPT | Performed by: EMERGENCY MEDICINE

## 2025-05-25 PROCEDURE — 36415 COLL VENOUS BLD VENIPUNCTURE: CPT

## 2025-05-25 PROCEDURE — 73560 X-RAY EXAM OF KNEE 1 OR 2: CPT

## 2025-05-25 PROCEDURE — 85652 RBC SED RATE AUTOMATED: CPT | Performed by: EMERGENCY MEDICINE

## 2025-05-25 PROCEDURE — 86140 C-REACTIVE PROTEIN: CPT | Performed by: EMERGENCY MEDICINE

## 2025-05-25 PROCEDURE — 99284 EMERGENCY DEPT VISIT MOD MDM: CPT | Performed by: EMERGENCY MEDICINE

## 2025-05-25 PROCEDURE — 85025 COMPLETE CBC W/AUTO DIFF WBC: CPT | Performed by: EMERGENCY MEDICINE

## 2025-05-25 PROCEDURE — 84550 ASSAY OF BLOOD/URIC ACID: CPT | Performed by: EMERGENCY MEDICINE

## 2025-05-25 PROCEDURE — 93971 EXTREMITY STUDY: CPT | Performed by: EMERGENCY MEDICINE

## 2025-05-25 RX ORDER — MELOXICAM 7.5 MG/1
7.5 TABLET ORAL DAILY
Qty: 14 TABLET | Refills: 0 | Status: SHIPPED | OUTPATIENT
Start: 2025-05-25 | End: 2025-06-08

## 2025-05-25 RX ORDER — KETOROLAC TROMETHAMINE 15 MG/ML
15 INJECTION, SOLUTION INTRAMUSCULAR; INTRAVENOUS ONCE
Status: COMPLETED | OUTPATIENT
Start: 2025-05-25 | End: 2025-05-25

## 2025-05-25 RX ORDER — KETOROLAC TROMETHAMINE 30 MG/ML
30 INJECTION, SOLUTION INTRAMUSCULAR; INTRAVENOUS ONCE
Status: DISCONTINUED | OUTPATIENT
Start: 2025-05-25 | End: 2025-05-25

## 2025-05-25 RX ORDER — PREDNISONE 50 MG/1
50 TABLET ORAL DAILY
Qty: 4 TABLET | Refills: 0 | Status: SHIPPED | OUTPATIENT
Start: 2025-05-25 | End: 2025-05-29

## 2025-05-25 RX ADMIN — KETOROLAC TROMETHAMINE 15 MG: 15 INJECTION, SOLUTION INTRAMUSCULAR; INTRAVENOUS at 10:51

## 2025-05-25 NOTE — FSED PROVIDER NOTE
Subjective  History of Present Illness:    Patient presents to the emergency department with right anterior knee pain.  She states is so painful that was giving out on her prior to arrival.  Denies any trauma.  Denies any joint swelling or erythema.  Denies any history of gout.  States that she woke up this morning with the pain.  Recently had an upper respiratory tract infection but denies any other infectious problems.  Was concerned she may have a DVT.  Denies any calf pain.  Denies any popliteal knee pain.  Denies any thigh pain.    Nurses Notes reviewed and agree, including vitals, allergies, social history and prior medical history.     REVIEW OF SYSTEMS: All systems reviewed and not pertinent unless noted.  Review of Systems   Musculoskeletal:  Positive for arthralgias.   All other systems reviewed and are negative.      Past Medical History:   Diagnosis Date    Arthritis     Cancer 2023    Squamous cell    Cataract     COPD (chronic obstructive pulmonary disease)     Enlarged lymph node in neck     near trachea    Gall stones     Hearing loss     Hyperlipidemia     Hypertension     Sciatic pain     Sleep apnea 2017       Allergies:    Latex      Past Surgical History:   Procedure Laterality Date    BREAST SURGERY      Neg biopsies    CARDIAC CATHETERIZATION N/A 2025    Procedure: Left Heart Cath - Right radial access;  Surgeon: Zacarias Sánchez MD;  Location: [facility] CATH INVASIVE LOCATION;  Service: Cardiovascular;  Laterality: N/A;    CATARACT EXTRACTION, BILATERAL       SECTION      COLONOSCOPY      MOHS SURGERY      TOTAL HIP ARTHROPLASTY Right     TUBAL ABDOMINAL LIGATION      VEIN SURGERY      R  Leg         Social History     Socioeconomic History    Marital status: Single   Tobacco Use    Smoking status: Former     Current packs/day: 0.00     Average packs/day: 0.8 packs/day for 80.0 years (60.0 ttl pk-yrs)     Types: Cigarettes     Quit date: 2023      "Years since quittin.6    Smokeless tobacco: Never   Vaping Use    Vaping status: Never Used    Passive vaping exposure: Yes   Substance and Sexual Activity    Alcohol use: Yes     Alcohol/week: 3.0 - 4.0 standard drinks of alcohol     Comment: 6-8 DRINKS PER WEEK    Drug use: No    Sexual activity: Not Currently     Birth control/protection: Tubal ligation         Family History   Problem Relation Age of Onset    Heart disease Mother     Hypertension Mother     Stroke Mother     Heart attack Mother     Diabetes Father     Hypertension Father     Hyperlipidemia Father     Stroke Brother     Hypertension Brother     Cancer Brother        Objective  Physical Exam:  /70   Pulse 54   Temp 98.2 °F (36.8 °C) (Oral)   Resp 18   Ht 175.3 cm (69\")   Wt 79.4 kg (175 lb)   SpO2 96%   BMI 25.84 kg/m²      Physical Exam  Vitals and nursing note reviewed.   Constitutional:       General: She is not in acute distress.     Appearance: Normal appearance. She is normal weight. She is not ill-appearing, toxic-appearing or diaphoretic.   HENT:      Head: Normocephalic and atraumatic.      Mouth/Throat:      Mouth: Mucous membranes are moist.   Eyes:      Extraocular Movements: Extraocular movements intact.      Conjunctiva/sclera: Conjunctivae normal.      Pupils: Pupils are equal, round, and reactive to light.   Cardiovascular:      Rate and Rhythm: Normal rate and regular rhythm.      Pulses: Normal pulses.      Heart sounds: Normal heart sounds.   Pulmonary:      Effort: Pulmonary effort is normal.      Breath sounds: Normal breath sounds.   Abdominal:      General: Abdomen is flat. Bowel sounds are normal.      Palpations: Abdomen is soft.   Musculoskeletal:      Comments: Patient has some hesitation with passive range of motion of the right knee.  There is a trace amount of joint swelling.  No overlying erythema.  Not warm to the touch.  No popliteal pain.  No pain of the thigh.  Homans' sign is negative.  Pulses " 2+ distally   Skin:     General: Skin is warm.      Capillary Refill: Capillary refill takes less than 2 seconds.   Neurological:      General: No focal deficit present.      Mental Status: She is alert and oriented to person, place, and time.   Psychiatric:         Mood and Affect: Mood normal.         Behavior: Behavior normal.         Thought Content: Thought content normal.         Judgment: Judgment normal.         US EXTREMITY R/O DVT IN ED BY PROVIDER    Date/Time: 5/25/2025 10:33 AM    Performed by: Petar Damon DO  Authorized by: Petar Damon DO    Procedure details:     Indications: lower extremity pain      Assessment for:  DVT  Findings:     Right common femoral vein:  Compressible    Right deep and superficial femoral veins:  Compressible    Right popliteal vein:  Compressible  Impression:     DVT:  None  Comments:      Ultrasound images were permanently stored within the patient's chart for review      ED Course:    ED Course as of 05/25/25 1200   Sun May 25, 2025   1124 XR Knee 1 or 2 View Right [BK]      ED Course User Index  [BK] Petar Damon DO       Lab Results (last 24 hours)       Procedure Component Value Units Date/Time    CBC & Differential [[id number]]  (Abnormal) Collected: 05/25/25 1049    Specimen: Blood Updated: 05/25/25 1118    Narrative:      The following orders were created for panel order CBC & Differential.  Procedure                               Abnormality         Status                     ---------                               -----------         ------                     CBC Auto Differential[[id number]]        Abnormal            Final result                 Please view results for these tests on the individual orders.    Comprehensive Metabolic Panel [[id number]]  (Abnormal) Collected: 05/25/25 1049    Specimen: Blood Updated: 05/25/25 1118     Glucose 84 mg/dL      BUN 13 mg/dL      Creatinine 0.70 mg/dL      Sodium 145 mmol/L      Potassium 4.0 mmol/L       Chloride 109 mmol/L      CO2 26.1 mmol/L      Calcium 9.1 mg/dL      Total Protein 6.7 g/dL      Albumin 4.4 g/dL      ALT (SGPT) 24 U/L      AST (SGOT) 32 U/L      Alkaline Phosphatase 102 U/L      Total Bilirubin 0.4 mg/dL      Globulin 2.3 gm/dL      A/G Ratio 1.9 g/dL      BUN/Creatinine Ratio 18.6     Anion Gap 9.9 mmol/L      eGFR 91.5 mL/min/1.73     Narrative:      GFR Categories in Chronic Kidney Disease (CKD)              GFR Category          GFR (mL/min/1.73)    Interpretation  G1                    90 or greater        Normal or high (1)  G2                    60-89                Mild decrease (1)  G3a                   45-59                Mild to moderate decrease  G3b                   30-44                Moderate to severe decrease  G4                    15-29                Severe decrease  G5                    14 or less           Kidney failure    (1)In the absence of evidence of kidney disease, neither GFR category G1 or G2 fulfill the criteria for CKD.    eGFR calculation 2021 CKD-EPI creatinine equation, which does not include race as a factor    Sedimentation Rate [[id number]]  (Normal) Collected: 05/25/25 1049    Specimen: Blood Updated: 05/25/25 1056     Sed Rate 2 mm/hr     C-reactive Protein [[id number]]  (Normal) Collected: 05/25/25 1049    Specimen: Blood Updated: 05/25/25 1117     C-Reactive Protein <0.30 mg/dL     Uric Acid [[id number]]  (Normal) Collected: 05/25/25 1049    Specimen: Blood Updated: 05/25/25 1118     Uric Acid 4.0 mg/dL     CBC Auto Differential [[id number]]  (Abnormal) Collected: 05/25/25 1049    Specimen: Blood Updated: 05/25/25 1118     WBC 6.59 10*3/mm3      RBC 4.26 10*6/mm3      Hemoglobin 13.0 g/dL      Hematocrit 40.0 %      MCV 93.9 fL      MCH 30.5 pg      MCHC 32.5 g/dL      RDW 16.2 %      RDW-SD 56.6 fl      MPV 11.5 fL      Platelets 255 10*3/mm3      Neutrophil % 59.4 %      Lymphocyte % 29.3 %      Monocyte % 8.5 %      Eosinophil % 2.0 %       Basophil % 0.6 %      Immature Grans % 0.2 %      Neutrophils, Absolute 3.92 10*3/mm3      Lymphocytes, Absolute 1.93 10*3/mm3      Monocytes, Absolute 0.56 10*3/mm3      Eosinophils, Absolute 0.13 10*3/mm3      Basophils, Absolute 0.04 10*3/mm3      Immature Grans, Absolute 0.01 10*3/mm3              No radiology results from the last 24 hrs       MDM  Number of Diagnoses or Management Options  Diagnosis management comments: Patient was evaluated x-rays were negative.  Bedside ultrasound was negative for DVT.  The seem to be within the knee capsule.  Her blood work was also negative.  She was discharged home with steroids and anti-inflammatories.  She will follow-up with orthopedics on an outpatient       Amount and/or Complexity of Data Reviewed  Clinical lab tests: reviewed        Data interpreted: Nursing notes reviewed, vital signs reviewed.  Labs independently interpreted by me (CBC, CMP, lipase, UA, troponin, ABG, lactic acid, procalcitonin).  Imaging independently interpreted by me (x-ray, CT scan).  EKG independently interpreted by me.  O2 saturation:    Counseling: Discussed the results above with the patient regarding need for admission or discharge.  Patient understands and agrees plan of care.      -----  ED Disposition       ED Disposition   Discharge    Condition   Stable    Comment   --             Final diagnoses:   Knee strain, right, initial encounter      Your Follow-Up Providers       Faisal Mar MD. Call in 3 days.    Specialty: Family Medicine  [address]  [place] [facility] [address] [phone]               [facility] EMERGENCY DEPART[facility] [place].    Specialty: Emergency Medicine  Follow up details: As needed  [address]  [facility] [address] [phone]                     Contact information for after-discharge care    Follow-up information has not been specified.                    Your medication list        START taking these  medications        Instructions Last Dose Given Next Dose Due   meloxicam 7.5 MG tablet  Commonly known as: MOBIC      Take 1 tablet by mouth Daily for 14 days.       predniSONE 50 MG tablet  Commonly known as: DELTASONE      Take 1 tablet by mouth Daily for 4 days.              ASK your doctor about these medications        Instructions Last Dose Given Next Dose Due   amLODIPine 5 MG tablet  Commonly known as: NORVASC      Take 1 tablet by mouth Daily.       Aspirin Adult Low Dose 81 MG EC tablet  Generic drug: aspirin      Take 1 tablet by mouth Daily.       atorvastatin 80 MG tablet  Commonly known as: LIPITOR      Take 1 tablet by mouth Every Night.       bisoprolol 5 MG tablet  Commonly known as: ZEBeta      Take 0.5 tablets by mouth Daily.       losartan 100 MG tablet  Commonly known as: COZAAR      Take 1 tablet by mouth Daily.       nitroglycerin 0.4 MG SL tablet  Commonly known as: NITROSTAT      1 under the tongue as needed for angina, may repeat q5mins for up three doses       VITAMIN B 12 PO      Take 1 tablet by mouth Daily.       Vitamin D-3 125 MCG (5000 UT) tablet      Take 1 tablet by mouth Daily.                 Where to Get Your Medications        These medications were sent to [id number] - [place], [place] - [address] [place] [address] AT SEC OF [address] [facility] - [phone]  - [phone] [address] [address]      Phone: [phone]   meloxicam 7.5 MG tablet  predniSONE 50 MG tablet

## 2025-06-05 ENCOUNTER — PATIENT OUTREACH (OUTPATIENT)
Dept: CASE MANAGEMENT | Facility: OTHER | Age: 75
End: 2025-06-05
Payer: MEDICARE

## 2025-06-05 NOTE — OUTREACH NOTE
AMBULATORY CASE MANAGEMENT NOTE    Names and Relationships of Patient/Support Persons: Contact: Joseph, Lanie Johnson; Relationship: Self -     Patient Outreach  RN-ACM outreach with patient. Discussed 5/25/25 ED visit regarding right knee strain. Patient treated and discharged.  Patient states to be compliant with ED recommendations and states symptoms have improved. Patient states to have obtained medications as ED recommended but has not taken as pain has subsided. Patient states to be ambulating without difficulty; without use of assistive device and no difficulty with falls. Patient states to be compliant with medications and medical appointments. Patient voiced intent to follow up with PCP as needed and has 7/7/25 PCP appointment. Reviewed with patient ED AVS recommendations; education; role of RN-ACM and HRCM case management services. Patient verbalized understanding. Patient states to appreciate outreach and declines needs for further outreach at this time. No further questions voiced at this time.   Adult Patient Profile  Questions/Answers      Flowsheet Row Most Recent Value   Symptoms/Conditions Managed at Home other (see comments)  [knee strain]   Barriers to Taking Medication as Prescribed none   Primary Source of Support/Comfort child(ton)   People in Home alone        Social Work Assessment  Questions/Answers      Flowsheet Row Most Recent Value   People in Home alone   Functional Status Comments Patient states to be independent with ADL's,  transportation and ambulating without assistive device.        Send Education  Questions/Answers      Flowsheet Row Most Recent Value   Other Patient Education/Resources  24/7 [facility] Nurse Call Line        SDOH updated and reviewed with the patient during this program:  --     Food Insecurity: No Food Insecurity (6/5/2025)    Hunger Vital Sign     Worried About Running Out of Food in the Last Year: Never true     Ran Out of Food in the Last Year: Never  true      --     Transportation Needs: No Transportation Needs (6/5/2025)    PRAPARE - Transportation     Lack of Transportation (Medical): No     Lack of Transportation (Non-Medical): No       Education Documentation  Unresolved/Worsening Symptoms, taught by Thais Golden, RN at 6/5/2025  5:36 PM.  Learner: Patient  Readiness: Acceptance  Method: Explanation  Response: Verbalizes Understanding          Thais BARRETT  Ambulatory Case Management    6/5/2025, 17:36 EDT

## 2025-06-12 ENCOUNTER — OFFICE VISIT (OUTPATIENT)
Dept: CARDIOLOGY | Facility: CLINIC | Age: 75
End: 2025-06-12
Payer: MEDICARE

## 2025-06-12 ENCOUNTER — PATIENT ROUNDING (BHMG ONLY) (OUTPATIENT)
Dept: CARDIOLOGY | Facility: CLINIC | Age: 75
End: 2025-06-12
Payer: MEDICARE

## 2025-06-12 VITALS
OXYGEN SATURATION: 99 % | BODY MASS INDEX: 25.91 KG/M2 | DIASTOLIC BLOOD PRESSURE: 82 MMHG | SYSTOLIC BLOOD PRESSURE: 126 MMHG | WEIGHT: 181 LBS | HEIGHT: 70 IN | HEART RATE: 65 BPM

## 2025-06-12 DIAGNOSIS — G89.29 CHRONIC CHEST PAIN WITH HIGH RISK FOR CAD: Primary | ICD-10-CM

## 2025-06-12 DIAGNOSIS — R07.9 CHRONIC CHEST PAIN WITH HIGH RISK FOR CAD: Primary | ICD-10-CM

## 2025-06-12 DIAGNOSIS — Z91.89 CHRONIC CHEST PAIN WITH HIGH RISK FOR CAD: Primary | ICD-10-CM

## 2025-06-12 DIAGNOSIS — I25.118 CORONARY ARTERY DISEASE OF NATIVE ARTERY OF NATIVE HEART WITH STABLE ANGINA PECTORIS: ICD-10-CM

## 2025-06-12 DIAGNOSIS — I10 ESSENTIAL HYPERTENSION: ICD-10-CM

## 2025-06-12 PROCEDURE — 99214 OFFICE O/P EST MOD 30 MIN: CPT | Performed by: PHYSICIAN ASSISTANT

## 2025-06-12 PROCEDURE — 3079F DIAST BP 80-89 MM HG: CPT | Performed by: PHYSICIAN ASSISTANT

## 2025-06-12 PROCEDURE — 3074F SYST BP LT 130 MM HG: CPT | Performed by: PHYSICIAN ASSISTANT

## 2025-06-12 RX ORDER — BUPROPION HYDROCHLORIDE 150 MG/1
150 TABLET ORAL DAILY
Qty: 30 TABLET | Refills: 2 | Status: SHIPPED | OUTPATIENT
Start: 2025-06-12

## 2025-06-12 NOTE — PROGRESS NOTES
June 12, 2025    Hello, may I speak with Lanie Johnson Joseph?    My name is RAY      I am  with [facility]  [address]  [facility] [place] [address] [phone].    Before we get started may I verify your date of birth? 1950    I am calling to officially welcome you to our practice and ask about your recent visit. Is this a good time to talk?     Tell me about your visit with us. What things went well?  APPT WAS ON TIME       We're always looking for ways to make our patients' experiences even better. Do you have recommendations on ways we may improve?  NO    Overall were you satisfied with your first visit to our practice? 9 OUT OF 10-NOBODY IS PERFECT       I appreciate you taking the time to speak with me today. Is there anything else I can do for you?       Thank you, and have a great day.

## 2025-06-12 NOTE — PROGRESS NOTES
[facility]   OFFICE NOTE      Lanie Ruiz  1950  PCP: Faisal Mar MD    SUBJECTIVE:   Lanie Ruiz is a 74 y.o. female seen for a follow up visit regarding the following:     CC: Noncardiac chest pain.    HPI:   Pleasant 74-year-old female returns today follow-up regarding CAD, HTN, HLD and ongoing tobacco abuse.  She has had a left heart catheterization revealing significant coronary disease by Dr. Sánchez.  There is no good targets for catheter-based intervention was recommended she continue medical management.  She seems to be doing very well on the current medicines.  She is not having ongoing worsening angina symptoms.  She does walk a couple miles a day.  She is trying to work on quitting smoking.  She has no worsening chest pain heart failure symptoms dizziness near syncope or syncope.    Cardiac PMH: (Old records have been reviewed and summarized below)  Chest pain syndrome.   Remote negative Stress MPS  Followed cardiologist in [place]  Echocardiogram [place], normal LV function mild calcification aortic valve no significant aortic stenosis.  No other significant valvular heart disease.  Stress test October 23, 2023 no evidence of ischemia normal LV function  Persistent recurrent chest pain, stress test [place] inferior lateral ischemia normal LV function  Echocardiogram EF 60% calcification of aortic valve no stenosis noted 5/7/2025  Left heart catheterization May 7, 2025 revealing 100%  bifurcation of 2 OM's with left to left collaterals.  This explains the abnormality on previous stress test.  30% mid LAD and apical LAD 80% to small fraction.  RCA 50%.  Normal LV function.  Medical therapy recommend Dr. Sánchez.  HTN  Multiple Medications  Recently restarted Chlorithiadone  Dizziness-resolved.  Carotid duplex scan less than 50% stenosis.  Recommend continue high-dose statin Lipitor therapy, stop smoking  Mild Obesity  HLD  Tobacco abuse, 1/2  "ppd   OA   Family History of CAD,Brother MI     Past Medical History, Past Surgical History, Family history, Social History, and Medications were all reviewed with the patient today and updated as necessary.       Current Outpatient Medications:     amLODIPine (NORVASC) 5 MG tablet, Take 1 tablet by mouth Daily., Disp: 30 tablet, Rfl: 11    aspirin (Aspirin Adult Low Dose) 81 MG EC tablet, Take 1 tablet by mouth Daily., Disp: , Rfl:     atorvastatin (LIPITOR) 80 MG tablet, Take 1 tablet by mouth Every Night., Disp: 90 tablet, Rfl: 3    bisoprolol (ZEBeta) 5 MG tablet, Take 0.5 tablets by mouth Daily., Disp: 30 tablet, Rfl: 6    Cholecalciferol (Vitamin D-3) 125 MCG (5000 UT) tablet, Take 1 tablet by mouth Daily., Disp: , Rfl:     Cyanocobalamin (VITAMIN B 12 PO), Take 1 tablet by mouth Daily., Disp: , Rfl:     losartan (COZAAR) 100 MG tablet, Take 1 tablet by mouth Daily., Disp: 90 tablet, Rfl: 3    nitroglycerin (NITROSTAT) 0.4 MG SL tablet, 1 under the tongue as needed for angina, may repeat q5mins for up three doses, Disp: 100 tablet, Rfl: 11    buPROPion XL (Wellbutrin XL) 150 MG 24 hr tablet, Take 1 tablet by mouth Daily., Disp: 30 tablet, Rfl: 2      Allergies   Allergen Reactions    Latex Rash         PHYSICAL EXAM:    /82 (BP Location: Right arm, Patient Position: Sitting, Cuff Size: Adult)   Pulse 65   Ht 177.8 cm (70\")   Wt 82.1 kg (181 lb)   SpO2 99%   BMI 25.97 kg/m²        Wt Readings from Last 5 Encounters:   06/12/25 82.1 kg (181 lb)   05/25/25 79.4 kg (175 lb)   05/05/25 79.4 kg (175 lb)   05/01/25 79.7 kg (175 lb 9.6 oz)   01/15/25 81 kg (178 lb 9.6 oz)       BP Readings from Last 5 Encounters:   06/12/25 126/82   05/25/25 134/77   05/07/25 144/74   05/01/25 148/82   01/15/25 138/78       General appearance - Alert, well appearing, and in no distress   Mental status - Affect appropriate to mood.  Eyes - Sclerae anicteric,  ENMT - Hearing grossly normal bilaterally, Dental hygiene " good.  Neck - Carotids upstroke normal bilaterally, no bruits, no JVD.  Resp -decreased breath sounds no wheezes or rhonchi  Heart - Normal rate, regular rhythm, normal S1, S2, 2/6 systolic ejection murmur  GI - Soft, nontender, nondistended, no masses or organomegaly.  Neurological - Grossly intact - normal speech, no focal findings  Musculoskeletal - No joint tenderness, deformity or swelling, no muscular tenderness noted.  Extremities - Peripheral pulses normal 1-2 Plus, no pedal edema, no clubbing or cyanosis.  Skin - Normal coloration and turgor.  Psych -  oriented to person, place, and time.    Medical problems and test results were reviewed with the patient today.         Procedures     EKG sinus rhythm no ST H wave abnormalities.  ASSESSMENT   1.  Chest pain previously negative stress test 2023 however persistent chest pain no positive stress test in [place] inferolateral ischemia normal LV function. 3/24/2025: Heart catheterization multivessel coronary disease no option for catheter-based intervention.  Continue aggressive risk factor modification medical therapy.  He is having no ongoing angina symptoms.    2.  Carotid duplex scan revealing less than 50% stenosis, continue statin aspirin therapy.    3.  Ongoing tobacco use: Discussed need for immediate cessation dangers not doing so    4.  Hypertension: Intolerance to higher dose amlodipine due to edema.  Will add Zebeta 2.5 mg daily.    5.  Lower extremity pain, negative ABIs.    PLAN  Rest risk factor modifications stop smoking continue aspirin statin and good blood pressure control.  She is tolerating things quite well at this time.  She asked for Wellbutrin to try to help with her quitting smoking will prescribe this and she will follow-up with her PCP the next couple months.  She return follow-up or office in 1 year or sooner as needed.    6/12/2025  15:45 EDT    Electronically signed by AVELINA Aguayo, 06/12/25, 3:46 PM EDT.

## 2025-07-07 ENCOUNTER — OFFICE VISIT (OUTPATIENT)
Dept: FAMILY MEDICINE CLINIC | Facility: CLINIC | Age: 75
End: 2025-07-07
Payer: MEDICARE

## 2025-07-07 VITALS
SYSTOLIC BLOOD PRESSURE: 112 MMHG | TEMPERATURE: 97.5 F | WEIGHT: 173 LBS | BODY MASS INDEX: 24.77 KG/M2 | RESPIRATION RATE: 18 BRPM | HEART RATE: 70 BPM | DIASTOLIC BLOOD PRESSURE: 68 MMHG | HEIGHT: 70 IN

## 2025-07-07 DIAGNOSIS — G62.9 NEUROPATHY: ICD-10-CM

## 2025-07-07 DIAGNOSIS — Z12.2 ENCOUNTER FOR SCREENING FOR LUNG CANCER: ICD-10-CM

## 2025-07-07 DIAGNOSIS — Z87.891 PERSONAL HISTORY OF TOBACCO USE, PRESENTING HAZARDS TO HEALTH: ICD-10-CM

## 2025-07-07 DIAGNOSIS — Z00.00 MEDICARE ANNUAL WELLNESS VISIT, SUBSEQUENT: Primary | ICD-10-CM

## 2025-07-07 PROCEDURE — 3074F SYST BP LT 130 MM HG: CPT | Performed by: FAMILY MEDICINE

## 2025-07-07 PROCEDURE — 99214 OFFICE O/P EST MOD 30 MIN: CPT | Performed by: FAMILY MEDICINE

## 2025-07-07 PROCEDURE — 1159F MED LIST DOCD IN RCRD: CPT | Performed by: FAMILY MEDICINE

## 2025-07-07 PROCEDURE — 3078F DIAST BP <80 MM HG: CPT | Performed by: FAMILY MEDICINE

## 2025-07-07 PROCEDURE — G0439 PPPS, SUBSEQ VISIT: HCPCS | Performed by: FAMILY MEDICINE

## 2025-07-07 PROCEDURE — 1160F RVW MEDS BY RX/DR IN RCRD: CPT | Performed by: FAMILY MEDICINE

## 2025-07-07 PROCEDURE — 1126F AMNT PAIN NOTED NONE PRSNT: CPT | Performed by: FAMILY MEDICINE

## 2025-07-07 RX ORDER — DIPHENOXYLATE HYDROCHLORIDE AND ATROPINE SULFATE 2.5; .025 MG/1; MG/1
1 TABLET ORAL DAILY
COMMUNITY

## 2025-07-07 NOTE — PROGRESS NOTES
Subjective   The ABCs of the Annual Wellness Visit  Medicare Wellness Visit      Lanie Ruiz is a 74 y.o. patient who presents for a Medicare Wellness Visit.    The following portions of the patient's history were reviewed and   updated as appropriate: allergies, current medications, past medical history, past social history, past surgical history, and problem list.    Compared to one year ago, the patient's physical   health is the same.  Compared to one year ago, the patient's mental   health is the same.    Recent Hospitalizations:  She was not admitted to the hospital during the last year.     Current Medical Providers:  Patient Care Team:  Faisal Mar MD as PCP - General (Family Medicine)  Carrington Marc PA as Physician Assistant (Cardiology)    Outpatient Medications Prior to Visit   Medication Sig Dispense Refill    amLODIPine (NORVASC) 5 MG tablet Take 1 tablet by mouth Daily. 30 tablet 11    aspirin (Aspirin Adult Low Dose) 81 MG EC tablet Take 1 tablet by mouth Daily.      atorvastatin (LIPITOR) 80 MG tablet Take 1 tablet by mouth Every Night. 90 tablet 3    bisoprolol (ZEBeta) 5 MG tablet Take 0.5 tablets by mouth Daily. 30 tablet 6    buPROPion XL (Wellbutrin XL) 150 MG 24 hr tablet Take 1 tablet by mouth Daily. 30 tablet 2    losartan (COZAAR) 100 MG tablet Take 1 tablet by mouth Daily. 90 tablet 3    multivitamin tablet tablet Take 1 tablet by mouth Daily.      nitroglycerin (NITROSTAT) 0.4 MG SL tablet 1 under the tongue as needed for angina, may repeat q5mins for up three doses 100 tablet 11    Cholecalciferol (Vitamin D-3) 125 MCG (5000 UT) tablet Take 1 tablet by mouth Daily.      Cyanocobalamin (VITAMIN B 12 PO) Take 1 tablet by mouth Daily.       No facility-administered medications prior to visit.     No opioid medication identified on active medication list. I have reviewed chart for other potential  high risk medication/s and harmful drug interactions in the  "elderly.      Aspirin is on active medication list. Aspirin use is indicated based on review of current medical condition/s. Pros and cons of this therapy have been discussed today. Benefits of this medication outweigh potential harm.  Patient has been encouraged to continue taking this medication.  .      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  ACP discussion was held with the patient during this visit. Has living well            Objective   Vitals:    07/07/25 1050   BP: 112/68   Pulse: 70   Resp: 18   Temp: 97.5 °F (36.4 °C)   Weight: 78.5 kg (173 lb)   Height: 177.8 cm (70\")   PainSc: 0-No pain       Estimated body mass index is 24.82 kg/m² as calculated from the following:    Height as of this encounter: 177.8 cm (70\").    Weight as of this encounter: 78.5 kg (173 lb).    BMI is within normal parameters. No other follow-up for BMI required.           Does the patient have evidence of cognitive impairment? No  Lab Results   Component Value Date    TRIG 120 05/05/2025    HDL 72 (H) 05/05/2025    LDL 88 05/05/2025    VLDL 21 05/05/2025    HGBA1C 5.50 05/05/2025                                                                                                Health  Risk Assessment    Smoking Status:  Social History     Tobacco Use   Smoking Status Every Day    Current packs/day: 0.00    Average packs/day: 0.8 packs/day for 80.0 years (60.0 ttl pk-yrs)    " Types: Cigarettes    Last attempt to quit: 2023    Years since quittin.8   Smokeless Tobacco Never     Alcohol Consumption:  Social History     Substance and Sexual Activity   Alcohol Use Yes    Alcohol/week: 2.0 standard drinks of alcohol    Types: 2 Drinks containing 0.5 oz of alcohol per week    Comment: 6-8 DRINKS PER WEEK       Fall Risk Screen  DARSHANA Fall Risk Assessment was completed, and patient is at HIGH risk for falls. Assessment completed on:2025    Depression Screening   Little interest or pleasure in doing things? Not at all   Feeling down, depressed, or hopeless? Not at all   PHQ-2 Total Score 0      Health Habits and Functional and Cognitive Screenin/30/2025     8:15 AM   Functional & Cognitive Status   Do you have difficulty preparing food and eating? No   Do you have difficulty bathing yourself, getting dressed or grooming yourself? No   Do you have difficulty using the toilet? No   Do you have difficulty moving around from place to place? No   Do you have trouble with steps or getting out of a bed or a chair? No   Current Diet Well Balanced Diet   Dental Exam Up to date   Eye Exam Up to date   Exercise (times per week) 6 times per week   Current Exercises Include Light Weights;Walking   Do you need help using the phone?  No   Are you deaf or do you have serious difficulty hearing?  Yes   Do you need help to go to places out of walking distance? No   Do you need help shopping? No   Do you need help preparing meals?  No   Do you need help with housework?  No   Do you need help with laundry? No   Do you need help taking your medications? No   Do you need help managing money? No   Do you ever drive or ride in a car without wearing a seat belt? No   Have you felt unusual fatigue (could be tiredness), stress, anger or loneliness in the last month? No   Who do you live with? Alone   If you need help, do you have trouble finding someone available to you? No   Have you been bothered  in the last four weeks by sexual problems? No   Do you have difficulty concentrating, remembering or making decisions? No           Age-appropriate Screening Schedule:  Refer to the list below for future screening recommendations based on patient's age, sex and/or medical conditions. Orders for these recommended tests are listed in the plan section. The patient has been provided with a written plan.    Health Maintenance List  Health Maintenance   Topic Date Due    LUNG CANCER SCREENING  Never done    HEPATITIS C SCREENING  Never done    DXA SCAN  02/12/2022    INFLUENZA VACCINE  10/01/2025    TDAP/TD VACCINES (2 - Tdap) 10/09/2025    MAMMOGRAM  02/13/2026    LIPID PANEL  05/05/2026    ANNUAL WELLNESS VISIT  07/07/2026    COLORECTAL CANCER SCREENING  03/08/2031    Pneumococcal Vaccine 50+  Completed    COVID-19 Vaccine  Discontinued    ZOSTER VACCINE  Discontinued                                                                                                                                                CMS Preventative Services Quick Reference  Risk Factors Identified During Encounter  Fall Risk-High or Moderate: Discussed Fall Prevention in the home  Dental Screening Recommended  Vision Screening Recommended    The above risks/problems have been discussed with the patient.  Pertinent information has been shared with the patient in the After Visit Summary.  An After Visit Summary and PPPS were made available to the patient.    Follow Up:   Next Medicare Wellness visit to be scheduled in 1 year.         Additional E&M Note during same encounter follows:  Patient has additional, significant, and separately identifiable condition(s)/problem(s) that require work above and beyond the Medicare Wellness Visit     Chief Complaint  No chief complaint on file.    Ochoa AVILA  Octavia is also being seen today for additional medical problem/s.       The patient is a 74-year-old female presenting for follow-up.    She fell  on Saturday, tripping over a footstool and landing on the right side of her face, resulting in worsening swelling and bruising. No severe pain, double vision, loss of consciousness, or skipped heartbeats. Continues regular medication regimen, uses CPAP, and reports no current health issues other than the fall. No recent illnesses, satisfactory energy levels, no chest pain or shortness of breath except when climbing stairs. Regular bowel movements, no blood in stool or urine, no urinary difficulties. Occasional joint and back pain, weight gain, eats without difficulty, exercises three days a week, walks daily.    Diagnosed with worsening neuropathy, numbness from feet to knees affecting balance. Normal blood tests, negative for Lyme disease and arsenic poisoning. Nerve study showed moderate to severe distal sensory peripheral neuropathy, cause unknown. Occasional sharp pain and toe curling, perceived leg weakness and swelling. Past MRIs, not claustrophobic. Received radiation therapy for leg and injections for squamous cell carcinoma.    Concerned about lung health, requests CT scan. History of smoking for over 50 years, previous lung nodules unchanged.    ER visit on 05/25/2025 for severe knee pain, resolved with pain injection. Heart catheterization on 05/07/2025 revealed 100% blockage, no stent placed. Chest pain twice, prescribed nitroglycerin but no episodes since. 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 Wears hearing aids, needs check-up, especially bad hearing on left side. Past neck issues with TMJ, resolved.    PAST SURGICAL HISTORY:  - Dysplasia of the vulva surgery (2 years ago)    SOCIAL HISTORY  - Smokes daily, over 50 years  Review of Systems   Constitutional: Negative.  Negative for fatigue.   HENT: Negative.  Negative for congestion.    Eyes: Negative.    Respiratory:  Positive for shortness of breath (steps, had heart cath May 2025).  "Negative for cough.    Cardiovascular: Negative.  Negative for chest pain.   Gastrointestinal: Negative.  Negative for blood in stool, constipation and vomiting.   Genitourinary: Negative.  Negative for difficulty urinating.   Musculoskeletal: Negative.  Negative for arthralgias and back pain.   Neurological:  Positive for numbness (feet to numbs.).          Objective   Vital Signs:  /68   Pulse 70   Temp 97.5 °F (36.4 °C)   Resp 18   Ht 177.8 cm (70\")   Wt 78.5 kg (173 lb)   BMI 24.82 kg/m²   Physical Exam  Vitals and nursing note reviewed.   Constitutional:       General: She is not in acute distress.     Appearance: Normal appearance. She is not ill-appearing or toxic-appearing.   HENT:      Head: Normocephalic.   Neurological:      Mental Status: She is alert.   Psychiatric:         Mood and Affect: Mood normal.         Behavior: Behavior normal.           Neurological: Strength good, reflexes normal, no focal deficits.  Head: Mild swelling below right eye, tenderness right face bruise, no severe pain or deformity.  Ears: Normal.  Eyes: No double vision, mild swelling below right eye.  Mouth/Throat: Normal.  Neck: No tenderness.  Respiratory: Clear, no wheezing, rales, or rhonchi.  Cardiovascular: Regular rate and rhythm, no murmurs, rubs, or gallops.  Gastrointestinal: Soft, no tenderness, distention, or masses.  Musculoskeletal: Strength good, no swelling.  Skin: Mild swelling and bruising right face.            Results  - Labs:    - Blood count: 05/25/2025, Normal    - Kidney function test: 05/25/2025, Normal    - Liver function test: 05/25/2025, Normal    - Inflammation markers: 05/25/2025, Normal    - A1c: 05/05/2025, 5.5    - Cholesterol levels: 05/05/2025, Normal    - Imaging:    - X-ray of knee: 05/25/2025, Moderate arthritis    - Ultrasound of heart: 05/07/2025, Heart function at 60%    - Diagnostic Testing:    - Nerve study: Moderate to severe distal sensory neuropathy, peripheral " neuropathy           Assessment and Plan     Diagnoses and all orders for this visit:    1. Medicare annual wellness visit, subsequent (Primary)    2. Encounter for screening for lung cancer  -     CT Chest Low Dose WO; Future    3. Personal history of tobacco use, presenting hazards to health  -     CT Chest Low Dose WO; Future    4. Neuropathy  -     MRI Brain With & Without Contrast; Future  -     MRI cervical spine w wo contrast; Future          Fall-related injury.  Fell on Saturday, tripping over a footstool, landing on the right side of the face, resulting in swelling and bruising. Bruising worsened; no severe pain, vision issues, or loss of consciousness. Blood pressure 112/68 mmHg. Advised ice post-injury; cautioned about loose rugs and lighting to prevent falls.    Peripheral neuropathy.  Worsening neuropathy, numbness from feet to knees affecting balance. Negative tests for Lyme disease and heavy metals; nerve study showed moderate to severe distal sensory neuropathy. MRI of brain and neck ordered to investigate causes. Discussed neuropathy medications primarily address pain, which she does not experience.  Neurology previously had recommended MRI cervical spine and brain but had not been completed.    Lung cancer screening.  History of smoking over 50 years, requires annual screening. Last CT scan over a year ago; no previous nodules. CT scan of lungs ordered; imaging center will contact, results to primary care physician in [place].    Health maintenance.  Up to date on shingles vaccine, received pneumonia shot 2 weeks ago. Declined further COVID-19 vaccines. Declined bone density test; plans flu shot this year.         Follow Up   Return for follow up depends on review of labs and testing.  Patient was given instructions and counseling regarding her condition or for health maintenance advice. Please see specific information pulled into the AVS if appropriate.  Patient or patient representative  verbalized consent for the use of Ambient Listening during the visit with  Faisal Mar MD for chart documentation. 7/7/2025  18:07 EDT

## 2025-07-23 ENCOUNTER — HOSPITAL ENCOUNTER (OUTPATIENT)
Dept: CT IMAGING | Facility: HOSPITAL | Age: 75
Discharge: HOME | End: 2025-07-23
Admitting: FAMILY MEDICINE
Payer: MEDICARE

## 2025-07-23 DIAGNOSIS — Z12.2 ENCOUNTER FOR SCREENING FOR LUNG CANCER: ICD-10-CM

## 2025-07-23 DIAGNOSIS — Z87.891 PERSONAL HISTORY OF TOBACCO USE, PRESENTING HAZARDS TO HEALTH: ICD-10-CM

## 2025-07-23 PROCEDURE — 71271 CT THORAX LUNG CANCER SCR C-: CPT

## 2025-07-25 ENCOUNTER — HOSPITAL ENCOUNTER (OUTPATIENT)
Dept: MRI IMAGING | Facility: HOSPITAL | Age: 75
Discharge: HOME | End: 2025-07-25
Payer: MEDICARE

## 2025-07-25 DIAGNOSIS — G62.9 NEUROPATHY: ICD-10-CM

## 2025-07-25 PROCEDURE — A9577 INJ MULTIHANCE: HCPCS | Performed by: FAMILY MEDICINE

## 2025-07-25 PROCEDURE — 72156 MRI NECK SPINE W/O & W/DYE: CPT

## 2025-07-25 PROCEDURE — 25510000002 GADOBENATE DIMEGLUMINE 529 MG/ML SOLUTION: Performed by: FAMILY MEDICINE

## 2025-07-25 PROCEDURE — 70553 MRI BRAIN STEM W/O & W/DYE: CPT

## 2025-07-25 RX ADMIN — GADOBENATE DIMEGLUMINE 15 ML: 529 INJECTION, SOLUTION INTRAVENOUS at 14:21

## (undated) DEVICE — PK CATH CARD 10

## (undated) DEVICE — CATH DIAG EXPO .045 FL3  5F 100CM

## (undated) DEVICE — MODEL AT P65, P/N 701554-001KIT CONTENTS: HAND CONTROLLER, 3-WAY HIGH-PRESSURE STOPCOCK WITH ROTATING END AND PREMIUM HIGH-PRESSURE TUBING: Brand: ANGIOTOUCH® KIT

## (undated) DEVICE — TR BAND RADIAL ARTERY COMPRESSION DEVICE: Brand: TR BAND

## (undated) DEVICE — MODEL BT2000 P/N 700287-012KIT CONTENTS: MANIFOLD WITH SALINE AND CONTRAST PORTS, SALINE TUBING WITH SPIKE AND HAND SYRINGE, TRANSDUCER: Brand: BT2000 AUTOMATED MANIFOLD KIT

## (undated) DEVICE — GW PERIPH GUIDERIGHT STD/EXCHNG/J/TIP SS 0.035IN 5X260CM

## (undated) DEVICE — LHK- LEFT HEART KIT BAPTIST: Brand: MEDLINE INDUSTRIES, INC.

## (undated) DEVICE — ADULT, W/LG. BACK PAD, RADIOTRANSPARENT ELEMENT AND LEAD WIRE COMPATIBLE W/: Brand: DEFIBRILLATION ELECTRODES

## (undated) DEVICE — CATH DIAG EXPO M/ PK 5F FL4/FR4 PIG

## (undated) DEVICE — CVR PROB ULTRASND/TRANSD W/GEL 7X11IN STRL

## (undated) DEVICE — GLIDESHEATH SLENDER STAINLESS STEEL KIT: Brand: GLIDESHEATH SLENDER